# Patient Record
Sex: MALE | Race: WHITE | NOT HISPANIC OR LATINO | Employment: UNEMPLOYED | URBAN - METROPOLITAN AREA
[De-identification: names, ages, dates, MRNs, and addresses within clinical notes are randomized per-mention and may not be internally consistent; named-entity substitution may affect disease eponyms.]

---

## 2020-03-05 ENCOUNTER — HOSPITAL ENCOUNTER (OUTPATIENT)
Dept: OTHER | Facility: HOSPITAL | Age: 70
Discharge: HOME | End: 2020-03-05
Attending: FAMILY MEDICINE

## 2020-03-05 ENCOUNTER — OFFICE VISIT CONVERTED (OUTPATIENT)
Dept: FAMILY MEDICINE CLINIC | Age: 70
End: 2020-03-05
Attending: FAMILY MEDICINE

## 2020-03-05 LAB
ALBUMIN SERPL-MCNC: 4.2 G/DL (ref 3.5–5)
ALBUMIN/GLOB SERPL: 1.4 {RATIO} (ref 1.4–2.6)
ALP SERPL-CCNC: 70 U/L (ref 56–155)
ALT SERPL-CCNC: 14 U/L (ref 10–40)
ANION GAP SERPL CALC-SCNC: 15 MMOL/L (ref 8–19)
AST SERPL-CCNC: 12 U/L (ref 15–50)
BASOPHILS # BLD MANUAL: 0.16 10*3/UL (ref 0–0.2)
BASOPHILS NFR BLD MANUAL: 1.4 % (ref 0–3)
BILIRUB SERPL-MCNC: 0.21 MG/DL (ref 0.2–1.3)
BUN SERPL-MCNC: 18 MG/DL (ref 5–25)
BUN/CREAT SERPL: 13 {RATIO} (ref 6–20)
CALCIUM SERPL-MCNC: 9.1 MG/DL (ref 8.7–10.4)
CHLORIDE SERPL-SCNC: 101 MMOL/L (ref 99–111)
CHOLEST SERPL-MCNC: 153 MG/DL (ref 107–200)
CHOLEST/HDLC SERPL: 4 {RATIO} (ref 3–6)
CONV CO2: 26 MMOL/L (ref 22–32)
CONV TOTAL PROTEIN: 7.2 G/DL (ref 6.3–8.2)
CREAT UR-MCNC: 1.34 MG/DL (ref 0.7–1.2)
DEPRECATED RDW RBC AUTO: 41.7 FL
EOSINOPHIL # BLD MANUAL: 0.44 10*3/UL (ref 0–0.7)
EOSINOPHIL NFR BLD MANUAL: 4 % (ref 0–7)
ERYTHROCYTE [DISTWIDTH] IN BLOOD BY AUTOMATED COUNT: 12.1 % (ref 11.5–14.5)
GFR SERPLBLD BASED ON 1.73 SQ M-ARVRAT: 53 ML/MIN/{1.73_M2}
GLOBULIN UR ELPH-MCNC: 3 G/DL (ref 2–3.5)
GLUCOSE SERPL-MCNC: 95 MG/DL (ref 70–99)
GRANS (ABSOLUTE): 6.65 10*3/UL (ref 2–8)
GRANS: 59.8 % (ref 30–85)
HBA1C MFR BLD: 15 G/DL (ref 14–18)
HCT VFR BLD AUTO: 45.1 % (ref 42–52)
HDLC SERPL-MCNC: 38 MG/DL (ref 40–60)
IMM GRANULOCYTES # BLD: 0.03 10*3/UL (ref 0–0.54)
IMM GRANULOCYTES NFR BLD: 0.3 % (ref 0–0.43)
LDLC SERPL CALC-MCNC: 87 MG/DL (ref 70–100)
LYMPHOCYTES # BLD MANUAL: 3.16 10*3/UL (ref 1–5)
LYMPHOCYTES NFR BLD MANUAL: 6.1 % (ref 3–10)
MCH RBC QN AUTO: 30.5 PG (ref 27–31)
MCHC RBC AUTO-ENTMCNC: 33.3 G/DL (ref 33–37)
MCV RBC AUTO: 91.7 FL (ref 80–96)
MONOCYTES # BLD AUTO: 0.68 10*3/UL (ref 0.2–1.2)
OSMOLALITY SERPL CALC.SUM OF ELEC: 288 MOSM/KG (ref 273–304)
PLATELET # BLD AUTO: 304 10*3/UL (ref 130–400)
PMV BLD AUTO: 9.4 FL (ref 7.4–10.4)
POTASSIUM SERPL-SCNC: 4.4 MMOL/L (ref 3.5–5.3)
RBC # BLD AUTO: 4.92 10*6/UL (ref 4.7–6.1)
SODIUM SERPL-SCNC: 138 MMOL/L (ref 135–147)
TRIGL SERPL-MCNC: 139 MG/DL (ref 40–150)
TSH SERPL-ACNC: 2.18 M[IU]/L (ref 0.27–4.2)
VARIANT LYMPHS NFR BLD MANUAL: 28.4 % (ref 20–45)
VLDLC SERPL-MCNC: 28 MG/DL (ref 5–37)
WBC # BLD AUTO: 11.12 10*3/UL (ref 4.8–10.8)

## 2020-10-08 ENCOUNTER — HOSPITAL ENCOUNTER (OUTPATIENT)
Dept: OTHER | Facility: HOSPITAL | Age: 70
Discharge: HOME | End: 2020-10-08
Attending: FAMILY MEDICINE

## 2020-10-08 ENCOUNTER — OFFICE VISIT CONVERTED (OUTPATIENT)
Dept: FAMILY MEDICINE CLINIC | Age: 70
End: 2020-10-08
Attending: FAMILY MEDICINE

## 2020-10-08 LAB
ALBUMIN SERPL-MCNC: 3.4 G/DL (ref 3.5–5)
ALBUMIN/GLOB SERPL: 0.9 {RATIO} (ref 1.4–2.6)
ALP SERPL-CCNC: 614 U/L (ref 56–155)
ALT SERPL-CCNC: 103 U/L (ref 10–40)
ANION GAP SERPL CALC-SCNC: 22 MMOL/L (ref 8–19)
APPEARANCE UR: ABNORMAL
AST SERPL-CCNC: 57 U/L (ref 15–50)
BACTERIA UR QL AUTO: ABNORMAL
BASOPHILS # BLD AUTO: 0.41 10*3/UL (ref 0–0.2)
BASOPHILS NFR BLD AUTO: 3.6 % (ref 0–3)
BILIRUB SERPL-MCNC: 20.63 MG/DL (ref 0.2–1.3)
BUN SERPL-MCNC: 16 MG/DL (ref 5–25)
BUN/CREAT SERPL: 11 {RATIO} (ref 6–20)
CALCIUM SERPL-MCNC: 10.5 MG/DL (ref 8.7–10.4)
CASTS URNS QL MICRO: ABNORMAL /[LPF]
CHLORIDE SERPL-SCNC: 95 MMOL/L (ref 99–111)
COLOR UR: ABNORMAL
CONV ABS IMM GRAN: 0.13 10*3/UL (ref 0–0.2)
CONV BILI, CONJUGATED: >10 MG/DL (ref 0–0.6)
CONV CO2: 23 MMOL/L (ref 22–32)
CONV IMMATURE GRAN: 1.2 % (ref 0–1.8)
CONV TOTAL PROTEIN: 7.2 G/DL (ref 6.3–8.2)
CONV UNCONJUGATED BILIRUBIN: 10.6 MG/DL (ref 0–1.1)
CREAT UR-MCNC: 1.44 MG/DL (ref 0.7–1.2)
DEPRECATED RDW RBC AUTO: 53.1 FL (ref 35.1–43.9)
EOSINOPHIL # BLD AUTO: 0.37 10*3/UL (ref 0–0.7)
EOSINOPHIL # BLD AUTO: 3.3 % (ref 0–7)
EPI CELLS #/AREA URNS HPF: ABNORMAL /[HPF]
ERYTHROCYTE [DISTWIDTH] IN BLOOD BY AUTOMATED COUNT: 16.4 % (ref 11.6–14.4)
GFR SERPLBLD BASED ON 1.73 SQ M-ARVRAT: 49 ML/MIN/{1.73_M2}
GLOBULIN UR ELPH-MCNC: 3.8 G/DL (ref 2–3.5)
GLUCOSE SERPL-MCNC: 127 MG/DL (ref 70–99)
HCT VFR BLD AUTO: 40.8 % (ref 42–52)
HGB BLD-MCNC: 14.2 G/DL (ref 14–18)
LIPASE SERPL-CCNC: 48 U/L (ref 5–51)
LYMPHOCYTES # BLD AUTO: 1.8 10*3/UL (ref 1–5)
LYMPHOCYTES NFR BLD AUTO: 15.9 % (ref 20–45)
MCH RBC QN AUTO: 30.7 PG (ref 27–31)
MCHC RBC AUTO-ENTMCNC: 34.8 G/DL (ref 33–37)
MCV RBC AUTO: 88.3 FL (ref 80–96)
MONOCYTES # BLD AUTO: 1.02 10*3/UL (ref 0.2–1.2)
MONOCYTES NFR BLD AUTO: 9 % (ref 3–10)
MUCOUS THREADS URNS QL MICRO: ABNORMAL
NEUTROPHILS # BLD AUTO: 7.57 10*3/UL (ref 2–8)
NEUTROPHILS NFR BLD AUTO: 67 % (ref 30–85)
NRBC CBCN: 0 % (ref 0–0.7)
OSMOLALITY SERPL CALC.SUM OF ELEC: 283 MOSM/KG (ref 273–304)
PLATELET # BLD AUTO: 401 10*3/UL (ref 130–400)
PMV BLD AUTO: 10.9 FL (ref 9.4–12.4)
POTASSIUM SERPL-SCNC: 4.7 MMOL/L (ref 3.5–5.3)
RBC # BLD AUTO: 4.62 10*6/UL (ref 4.7–6.1)
RBC # BLD AUTO: ABNORMAL /[HPF]
SODIUM SERPL-SCNC: 135 MMOL/L (ref 135–147)
SP GR UR STRIP: >=1.03 (ref 1–1.03)
SPECIMEN SOURCE: ABNORMAL
TSH SERPL-ACNC: 1.42 M[IU]/L (ref 0.27–4.2)
UNIDENT CRYS URNS QL MICRO: ABNORMAL /[HPF]
WBC # BLD AUTO: 11.3 10*3/UL (ref 4.8–10.8)
WBC #/AREA URNS HPF: ABNORMAL /[HPF]

## 2020-10-19 ENCOUNTER — HOSPITAL ENCOUNTER (OUTPATIENT)
Dept: OTHER | Facility: HOSPITAL | Age: 70
Discharge: HOME | End: 2020-10-19
Attending: NURSE PRACTITIONER

## 2020-10-19 ENCOUNTER — CONVERSION ENCOUNTER (OUTPATIENT)
Dept: OTHER | Facility: HOSPITAL | Age: 70
End: 2020-10-19

## 2020-10-19 ENCOUNTER — OFFICE VISIT CONVERTED (OUTPATIENT)
Dept: GASTROENTEROLOGY | Facility: CLINIC | Age: 70
End: 2020-10-19
Attending: NURSE PRACTITIONER

## 2020-10-19 LAB
ALBUMIN SERPL-MCNC: 4 G/DL (ref 3.5–5)
ALBUMIN/GLOB SERPL: 1 {RATIO} (ref 1.4–2.6)
ALP SERPL-CCNC: 750 U/L (ref 56–155)
ALT SERPL-CCNC: 95 U/L (ref 10–40)
ANION GAP SERPL CALC-SCNC: 24 MMOL/L (ref 8–19)
AST SERPL-CCNC: 64 U/L (ref 15–50)
BASOPHILS # BLD AUTO: 0.29 10*3/UL (ref 0–0.2)
BASOPHILS NFR BLD AUTO: 2.2 % (ref 0–3)
BILIRUB SERPL-MCNC: 31.88 MG/DL (ref 0.2–1.3)
BUN SERPL-MCNC: 21 MG/DL (ref 5–25)
BUN/CREAT SERPL: 13 {RATIO} (ref 6–20)
CALCIUM SERPL-MCNC: 10.9 MG/DL (ref 8.7–10.4)
CHLORIDE SERPL-SCNC: 94 MMOL/L (ref 99–111)
CONV ABS IMM GRAN: 0.11 10*3/UL (ref 0–0.2)
CONV CO2: 21 MMOL/L (ref 22–32)
CONV IMMATURE GRAN: 0.8 % (ref 0–1.8)
CONV TOTAL PROTEIN: 8.1 G/DL (ref 6.3–8.2)
CREAT UR-MCNC: 1.6 MG/DL (ref 0.7–1.2)
DEPRECATED RDW RBC AUTO: 57.3 FL (ref 35.1–43.9)
EOSINOPHIL # BLD AUTO: 0.19 10*3/UL (ref 0–0.7)
EOSINOPHIL # BLD AUTO: 1.4 % (ref 0–7)
ERYTHROCYTE [DISTWIDTH] IN BLOOD BY AUTOMATED COUNT: 17.9 % (ref 11.6–14.4)
FERRITIN SERPL-MCNC: 1706 NG/ML (ref 30–300)
GFR SERPLBLD BASED ON 1.73 SQ M-ARVRAT: 43 ML/MIN/{1.73_M2}
GLOBULIN UR ELPH-MCNC: 4.1 G/DL (ref 2–3.5)
GLUCOSE SERPL-MCNC: 122 MG/DL (ref 70–99)
HCT VFR BLD AUTO: 39.4 % (ref 42–52)
HGB BLD-MCNC: 13.6 G/DL (ref 14–18)
INR PPP: 1.21 (ref 2–3)
IRON SATN MFR SERPL: 60 % (ref 20–55)
IRON SERPL-MCNC: 158 UG/DL (ref 70–180)
LYMPHOCYTES # BLD AUTO: 1.19 10*3/UL (ref 1–5)
LYMPHOCYTES NFR BLD AUTO: 9 % (ref 20–45)
MCH RBC QN AUTO: 30.5 PG (ref 27–31)
MCHC RBC AUTO-ENTMCNC: 34.5 G/DL (ref 33–37)
MCV RBC AUTO: 88.3 FL (ref 80–96)
MONOCYTES # BLD AUTO: 1.48 10*3/UL (ref 0.2–1.2)
MONOCYTES NFR BLD AUTO: 11.2 % (ref 3–10)
NEUTROPHILS # BLD AUTO: 9.94 10*3/UL (ref 2–8)
NEUTROPHILS NFR BLD AUTO: 75.4 % (ref 30–85)
NRBC CBCN: 0 % (ref 0–0.7)
OSMOLALITY SERPL CALC.SUM OF ELEC: 282 MOSM/KG (ref 273–304)
PLATELET # BLD AUTO: 465 10*3/UL (ref 130–400)
PMV BLD AUTO: 11.7 FL (ref 9.4–12.4)
POTASSIUM SERPL-SCNC: 4.7 MMOL/L (ref 3.5–5.3)
PROTHROMBIN TIME: 12.6 S (ref 9.4–12)
RBC # BLD AUTO: 4.46 10*6/UL (ref 4.7–6.1)
SODIUM SERPL-SCNC: 134 MMOL/L (ref 135–147)
TIBC SERPL-MCNC: 263 UG/DL (ref 245–450)
TRANSFERRIN SERPL-MCNC: 184 MG/DL (ref 215–365)
WBC # BLD AUTO: 13.2 10*3/UL (ref 4.8–10.8)

## 2020-10-20 LAB
CONV HEPATITIS B SURFACE AG W CONFIRMATION RE: NEGATIVE
CONV IMMUNOGLOBULIN G (IGG): 1535 MG/DL (ref 603–1613)
CONV IMMUNOGLOBULIN M (IGM): 183 MG/DL (ref 20–172)
DEPRECATED MITOCHONDRIA M2 IGG SER-ACNC: <20 UNITS (ref 0–20)
HAV IGM SERPL QL IA: NEGATIVE
HBV CORE IGM SERPL QL IA: NEGATIVE
HCV AB SER DONR QL: <0.1 S/CO RATIO (ref 0–0.9)
IGA SERPL-MCNC: 337 MG/DL (ref 61–437)
PROT PATTERN SERPL IFE-IMP: ABNORMAL
SMOOTH MUSCLE F-ACTIN AB IGG: 7 UNITS (ref 0–19)

## 2020-10-21 LAB
BACTERIA UR CULT: NORMAL
DSDNA AB SER-ACNC: NEGATIVE [IU]/ML
ENA AB SER IA-ACNC: NEGATIVE {RATIO}

## 2020-10-22 LAB — CONV NASH FIBROSURE: NORMAL

## 2020-10-27 ENCOUNTER — OFFICE VISIT CONVERTED (OUTPATIENT)
Dept: FAMILY MEDICINE CLINIC | Age: 70
End: 2020-10-27
Attending: FAMILY MEDICINE

## 2020-10-27 LAB
A1AT SERPL-MCNC: 249 MG/DL (ref 101–187)
PHENOTYPE: ABNORMAL

## 2020-11-28 ENCOUNTER — HOSPITAL ENCOUNTER (OUTPATIENT)
Dept: PREADMISSION TESTING | Facility: HOSPITAL | Age: 70
Discharge: HOME | End: 2020-11-28
Attending: INTERNAL MEDICINE

## 2020-11-29 LAB — SARS-COV-2 RNA SPEC QL NAA+PROBE: NOT DETECTED

## 2020-12-09 ENCOUNTER — HOSPITAL ENCOUNTER (OUTPATIENT)
Dept: PREADMISSION TESTING | Facility: HOSPITAL | Age: 70
Discharge: HOME | End: 2020-12-09
Attending: INTERNAL MEDICINE

## 2020-12-10 ENCOUNTER — OFFICE VISIT CONVERTED (OUTPATIENT)
Dept: FAMILY MEDICINE CLINIC | Age: 70
End: 2020-12-10
Attending: FAMILY MEDICINE

## 2020-12-11 LAB — SARS-COV-2 RNA SPEC QL NAA+PROBE: NOT DETECTED

## 2020-12-17 ENCOUNTER — HOSPITAL ENCOUNTER (OUTPATIENT)
Dept: GASTROENTEROLOGY | Facility: HOSPITAL | Age: 70
Setting detail: HOSPITAL OUTPATIENT SURGERY
Discharge: HOME | End: 2020-12-17
Attending: INTERNAL MEDICINE

## 2020-12-23 ENCOUNTER — OFFICE VISIT CONVERTED (OUTPATIENT)
Dept: SURGERY | Facility: CLINIC | Age: 70
End: 2020-12-23
Attending: SURGERY

## 2020-12-23 ENCOUNTER — CONVERSION ENCOUNTER (OUTPATIENT)
Dept: SURGERY | Facility: CLINIC | Age: 70
End: 2020-12-23

## 2020-12-30 ENCOUNTER — HOSPITAL ENCOUNTER (OUTPATIENT)
Dept: PREADMISSION TESTING | Facility: HOSPITAL | Age: 70
Discharge: HOME | End: 2020-12-30
Attending: SURGERY

## 2020-12-31 LAB — SARS-COV-2 RNA SPEC QL NAA+PROBE: NOT DETECTED

## 2021-01-04 ENCOUNTER — HOSPITAL ENCOUNTER (OUTPATIENT)
Dept: PERIOP | Facility: HOSPITAL | Age: 71
Setting detail: HOSPITAL OUTPATIENT SURGERY
Discharge: HOME | End: 2021-01-04
Attending: SURGERY

## 2021-01-14 ENCOUNTER — CONVERSION ENCOUNTER (OUTPATIENT)
Dept: SURGERY | Facility: CLINIC | Age: 71
End: 2021-01-14

## 2021-01-14 ENCOUNTER — OFFICE VISIT CONVERTED (OUTPATIENT)
Dept: SURGERY | Facility: CLINIC | Age: 71
End: 2021-01-14
Attending: SURGERY

## 2021-05-10 NOTE — H&P
"   History and Physical      Patient Name: Viral Bach   Patient ID: [id number]   Sex: Male   YOB: 1950    Primary Care Provider: Miah Meade MD   Referring Provider: Miah Meade MD    Visit Date: October 19, 2020    Provider: ANA Barber   Location: [facility] [place]   Location Address: [address] [place] [address]  [address] [place] [address]  [id number]          Chief Complaint  · Elevated LFTs      History Of Present Illness  The patient is a 70 year old /White male who presents on referral from Miah Meade MD for a gastroenterology evaluation.      Patient presents today with new diagnosis of h pylori and a \"yellowing\" of his skin all within the last 2 weeks. Currently taking lansoprazole, amoxicillin, and clarithromycin but has yet to notice any difference. Admits to abdominal bloating and decreased appetite. 10lb weight loss in the last 2 months, unintentionally. Denies heartburn, dysphagia, or vomiting. Occasional nausea.    Bowel movement 1-3x daily, soft to loose stool. Denies any hematochezia, melena, or family hx of colon cancer. Pt has never had a colonoscopy.    Admits to drinking on the weekends, 6-7 beers a day. He has been doing this for several years. 1 unprofessional tattoo from 40+ years ago. Denies hx of iv/intranasal drug use or hx of blood transfusion. Denies any confusion however having a hard time sleeping at night. He also notes his urine is \"darker\" than normal.     H. pylori urea breath test 10/8/2020: Positive   CBC 10/8/2020: WBC 11.30, hemoglobin 14.2, hematocrit 40.8, platelets 401.  CMP GFR 49, alkaline phosphatase 614, AST 57, ALT 33, lipase 48, total bilirubin 20.63, unconjugated bilirubin 10.6, conjugated bili greater than 10.       Past Medical History  HTN (hypertension)         Past Surgical History  *Denies any surgical procedures; *No Past Surgical History         Medication List  amoxicillin 500 mg oral capsule; clarithromycin " 500 mg oral tablet; lansoprazole 30 mg oral capsule,delayed release(DR/EC); lisinopril 10 mg oral tablet         Allergy List  NO KNOWN DRUG ALLERGIES       Allergies Reconciled  Family Medical History  Family history of heart disease         Social History  Tobacco (Current every day)         Review of Systems  · Constitutional  o Denies  o : chills, fever  · Eyes  o Denies  o : blurred vision, changes in vision  · Cardiovascular  o Denies  o : chest pain, syncope  · Respiratory  o Denies  o : shortness of breath, dry cough  · Gastrointestinal  o Admits  o : See HPI  · Genitourinary  o Denies  o : dysuria, blood in urine  · Integument  o Denies  o : rash, new skin lesions  · Neurologic  o Denies  o : altered mental status, tingling or numbness  · Musculoskeletal  o Denies  o : joint pain, limitation of motion  · Endocrine  o Admits  o : weight loss  o Denies  o : weight gain  · Psychiatric  o Denies  o : anxiety, depression      Physical Examination  · Constitutional  o Appearance  o : well developed, well-nourished, in no acute distress  · Eyes  o Vision  o :   § Visual Fields  § : eyes move symmetrical in all directions  o Sclerae  o : anicteric  o Pupils and Irises  o : pupils equal and symmetrical  · Neck  o Inspection/Palpation  o : supple  · Respiratory  o Respiratory Effort  o : breathing unlabored  o Inspection of Chest  o : normal appearance, no retractions  o Auscultation of Lungs  o : clear to auscultation bilaterally  · Cardiovascular  o Heart  o :   § Auscultation of Heart  § : no murmurs, gallops or rubs  · Gastrointestinal  o Abdominal Examination  o : soft, nontender to palpation, with normal active bowel sounds, no appreciable hepatosplenomegaly  o Digital Rectal Exam  o : deferred  · Lymphatic  o Neck  o : no palpable lymphadenopathy  · Skin and Subcutaneous Tissue  o General Inspection  o : without focal lesions; turgor is normal  · Psychiatric  o General  o : Alert and oriented x3  o Mood and  Affect  o : Mood and affect are appropriate to circumstances          Assessment  · Pre-op testing     V72.84/Z01.818  · Abdominal bloating     787.3/R14.0  · Elevated alkaline phosphatase level     790.5/R74.8  · Elevated bilirubin     277.4/R17  · Elevated liver enzymes     790.5/R74.8  · H. pylori infection     041.86/A04.8  · Weight loss     783.21/R63.4      Plan  · Orders  o [facility] Pre-Op Covid-19 Screening (20476) - - 10/19/2020  o CBC with Auto Diff [facility] (93355) - - 10/19/2020  o CMP [facility] (95992) - - 10/19/2020  o RUQ US (right upper quadrant ultrasound) (47127) - - 10/19/2020  o Consent for Colonoscopy with Possible Biopsy - Possible risks/complications, benefits, and alternatives to surgical or invasive procedure have been explained to patient and/or legal guardian. -Patient has been evaluated and can tolerate anesthesia and/or sedation. Risks, benefits, and alternatives to anesthesia and sedation have been explained to patient and/or legal guardian. (49426) - - 10/19/2020  o Consent for Esophagogastroduodenoscopy (EGD) - Possible risks/complications, benefits, and alternatives to surgical or invasive procedure have been explained to patient and/or legal guardian. - Patient has been evaluated and can tolerate anesthesia and/or sedation. Risks, benefits, and alternatives to anesthesia and sedation have been explained to patient and/or legal guardian. (51601) - - 10/19/2020  o Iron + transferrin (TIBC, calculated % saturation) (44501) - - 10/19/2020  o Ferritin ser/plas (68346) - - 10/19/2020  o CAMPBELL (antinuclear antibody profile) by enzyme immunoassay (49619) - - 10/19/2020  o Anti-mitochondrial antibody assay (03266) - - 10/19/2020  o Anti-Smooth Muscle Antibody (ASMA) [facility] (47761) - - 10/19/2020  o Serum immunofixation electrophoresis (57675) - - 10/19/2020  o Alpha-1-Antitrypsin/Phenotype HM (21321, 41392) - - 10/19/2020  o PT (78640) - - 10/19/2020  o Acute hepatitis panel (HAV IgM, HbcAb IgM, HbsAg, HCV)  (71366, 46114, 88153, 41606, 36060) - - 10/19/2020  o CHAVEZ Fibrosure H (drawn at [facility] only and must be fasting 8 hours; requires HT and WT) (58508, 23528, 24791, 92099, 97832, 87235, 43246, 64744, 50688, 11344) - - 10/19/2020  o Urine Culture (Clean Catch) [facility] (70788) - - 10/19/2020  · Medications  o Medications have been Reconciled  · Instructions  o PLAN: Proceed with procedure. Patient understands risks and benefits and is willing to proceed. Understands the risks include, but are not limited to, bleeding and/or perforation.  o Information given on current diagnoses.  o Lifestyle modifications discussed.  o RUQ u/s STAT, concerned for possible biliary obstruction given Bilirubin.   o Electronically Identified Patient Education Materials Provided Electronically  · Disposition  o Follow up after procedure            Electronically Signed by: ANA Barber -Author on October 19, 2020 09:40:16 AM

## 2021-05-10 NOTE — H&P
History and Physical      Patient Name: Viral Bach   Patient ID: [id number]   Sex: Male   YOB: 1950    Primary Care Provider: Miah Meade MD   Referring Provider: Miah Meade MD    Visit Date: December 23, 2020    Provider: Oswald Rios MD   Location: [facility]   Location Address: [address] [place] [address]  [id number]   Location Phone: [phone]          Chief Complaint  · Outpatient History & Physical / Surgical Orders  · Gallbladder Consult      History Of Present Illness  Viral Bach is a 70 year old /White male who presents to the office today as a consult from Miah Meade MD. The patient follows up today for consideration for laparoscopic cholecystectomy. I last met Mr. Bach in the hospital where he was admitted for choledocholithiasis. He had such a large stone in his common duct that it was unable to be retrieved by ERCP. He had a stent placed and was given some time to decompress. He then went to the operating room where the stone was removed with ERCP and lithotripsy successfully. Now that he has his common duct stone removed he presents today for evaluation for cholecystectomy. The patient reports that since the procedure he is doing quite well, not reporting any symptoms, no signs of infection, no signs of cholecystitis.       Past Medical History  HTN (hypertension)         Past Surgical History  ERCP         Medication List  amoxicillin 500 mg oral capsule; clarithromycin 500 mg oral tablet; lansoprazole 30 mg oral capsule,delayed release(DR/EC); lisinopril 10 mg oral tablet; ursodiol 300 mg oral capsule         Allergy List  NO KNOWN DRUG ALLERGIES       Allergies Reconciled  Family Medical History  Family history of heart disease         Social History  Tobacco (Current every day)         Review of Systems  · Gastrointestinal  o Denies  o : nausea, vomiting, diarrhea, constipation      Vitals  Date Time BP Position Site L\R  "Cuff Size HR RR TEMP (F) WT  HT  BMI kg/m2 BSA m2 O2 Sat FR L/min FiO2 HC       12/23/2020 03:06 PM         144lbs 4oz 5'  8\" 21.93 1.77             Physical Examination  · Constitutional  o Appearance  o : well developed, well-nourished, alert and in no acute distress  · Head and Face  o Head  o :   § Inspection  § : no deformities or lesions  · Eyes  o Conjunctivae  o : clear  o Sclerae  o : clear  · Neck  o Inspection/Palpation  o : normal appearance, no masses or tenderness, trachea midline  · Respiratory  o Respiratory Effort  o : breathing unlabored  o Inspection of Chest  o : normal appearance, no retractions  · Cardiovascular  o Heart  o : regular rate and rhythm  · Gastrointestinal  o Abdominal Examination  o : soft  · Lymphatic  o Neck  o : no lymphadenopathy present  o Axilla  o : no lymphadenopathy present  o Groin  o : no lymphadenopathy present  · Skin and Subcutaneous Tissue  o General Inspection  o : no rashes present, no lesions present, no areas of discoloration  · Neurologic  o Cranial Nerves  o : grossly intact  o Sensation  o : grossly intact  o Gait and Station  o :   § Gait Screening  § : normal gait, able to stand without diffculty  o Cerebellar Function  o : no obvious abnormalities  · Psychiatric  o Judgement and Insight  o : judgment and insight intact  o Mood and Affect  o : mood normal, affect appropriate              Assessment  · Pre-Surgical Orders     V72.84  · Cholelithiasis     574.20/K80.20  · Pre-op testing     V72.84/Z01.818       Patient with history of choledocholithiasis in need of cholecystectomy to prevent any further issues with choledocholithiasis.       Plan  · Orders  o General Surgery Order (GENOR) - 574.20/K80.20 - 01/04/2021  o [facility] Pre-Op Covid-19 Screening (03034) - V72.84/Z01.818 - 12/30/2020   [facility] drive thru on 12/30/20 at 115PM  · Medications  o Medications have been Reconciled  o Transition of Care or Provider Policy  · Instructions  o PLAN:   o Handouts " Provided-Pre-Procedure Instructions including date and time and location of procedure.  o Surgical Facility: [facility]  o ****Patient Status****  o Outpatient  o ********************  o RISK AND BENEFITS:  o Consent for surgery: Given these options, the patient has verbally expressed an understanding of the risks of surgery and finds these risks acceptable. We will proceed with surgery as soon as possible.  o Consult Anesthesia for any post-operative block, or any pain management procedure deemed necessary by the anestesiologist for adequate post-operative pain control.   o O.R. PREP: Per protocol  o IV: Per Anesthesia  o PLEASE SIGN PERMIT FOR:Laparoscopic cholecystectomy, possible open procedure  o *__Kefzol 2 gram IV on call to OR.  o Indocyanine Green- 2.5MG IV- On Call To OR  o *___The above History and Physical Examination has been completed within 30 days of admission.  o Pre-Admission Testing Date: PAT PHONE: 12/29/20 at 1230PM  o Electronically Identified Patient Education Materials Provided Electronically     We will plan for laparoscopic cholecystectomy in the near future.  Risks, benefits, and alternatives of the procedure were discussed extensively.  All questions were answered.  The patient voiced understanding and agreed to proceed with the above plan.             Electronically Signed by: Alba Hallman-, Other -Author on December 28, 2020 01:58:16 PM  Electronically Co-signed by: Oswald Rios MD -Reviewer on December 28, 2020 08:26:56 PM

## 2021-05-14 VITALS — BODY MASS INDEX: 21.37 KG/M2 | HEIGHT: 68 IN | RESPIRATION RATE: 14 BRPM | WEIGHT: 141 LBS

## 2021-05-14 VITALS — HEIGHT: 68 IN | BODY MASS INDEX: 21.86 KG/M2 | WEIGHT: 144.25 LBS

## 2021-05-14 VITALS
BODY MASS INDEX: 20.8 KG/M2 | SYSTOLIC BLOOD PRESSURE: 140 MMHG | WEIGHT: 137.25 LBS | TEMPERATURE: 97 F | DIASTOLIC BLOOD PRESSURE: 59 MMHG | HEART RATE: 66 BPM | HEIGHT: 68 IN | RESPIRATION RATE: 16 BRPM

## 2021-05-14 NOTE — PROGRESS NOTES
"   Progress Note      Patient Name: Viral Bach   Patient ID: [id number]   Sex: Male   YOB: 1950    Primary Care Provider: Miah Meade MD   Referring Provider: Miah Meade MD    Visit Date: January 14, 2021    Provider: Oswald Rios MD   Location: [facility]   Location Address: [address] [place] [address]  [id number]   Location Phone: [phone]          Chief Complaint  · Follow Up Surgery      History Of Present Illness  Viral Bach is a 70 year old /White male who presents today for a postoperative visit. He follows-up status post laparoscopic cholecystectomy. He had a fairly difficult cholecystectomy. I did leave a drain. Since discharge, he has done well and has not reported any unexpected signs or symptoms. He is eating and drinking normally and is having regular bowel movements. He still has a little bit of pain but overall is feeling well. He has about 10 cc a day coming out of his drain. It appears to be serosanguineous.       Past Medical History  HTN (hypertension)         Past Surgical History  Cholecystectomy; ERCP; Kidney Stone Surgery, Unspecified         Medication List  amoxicillin 500 mg oral capsule; clarithromycin 500 mg oral tablet; lansoprazole 30 mg oral capsule,delayed release(DR/EC); lisinopril 10 mg oral tablet         Allergy List  NO KNOWN DRUG ALLERGIES         Family Medical History  Family history of heart disease         Social History  Tobacco (Current every day)         Review of Systems  · Cardiovascular  o Denies  o : chest pain on exertion, shortness of breath, lower extremity swelling  · Respiratory  o Denies  o : shortness of breath, coughing up blood  · Gastrointestinal  o Denies  o : chronic abdominal pain      Vitals  Date Time BP Position Site L\R Cuff Size HR RR TEMP (F) WT  HT  BMI kg/m2 BSA m2 O2 Sat FR L/min FiO2 HC       01/14/2021 09:01 AM       14  141lbs 0oz 5'  8\" 21.44 1.75             Physical " Examination  · Constitutional  o Appearance  o : well developed, well-nourished, alert and in no acute distress  · Head and Face  o Head  o :   § Inspection  § : no deformities or lesions  · Eyes  o Conjunctivae  o : clear  o Sclerae  o : nonicteric  · Neck  o Inspection/Palpation  o : normal appearance, no masses or tenderness, trachea midline  · Respiratory  o Respiratory Effort  o : breathing unlabored  o Inspection of Chest  o : normal appearance, no retractions  · Cardiovascular  o Heart  o : regular rate and rhythm  · Gastrointestinal  o Abdominal Examination  o :   § Abdomen  § : abdomen is soft. Incisions appear to be healing well. His drain is intact. It has about 8 cc of fluid in it. It is serosanguineous appearing.  · Lymphatic  o Neck  o : no lymphadenopathy present  o Axilla  o : no lymphadenopathy present  o Groin  o : no lymphadenopathy present  · Skin and Subcutaneous Tissue  o General Inspection  o : no rashes present, no lesions present, no areas of discoloration  · Neurologic  o Cranial Nerves  o : grossly intact  o Sensation  o : grossly intact  o Gait and Station  o :   § Gait Screening  § : normal gait, able to stand without diffculty  o Cerebellar Function  o : no obvious abnormalities  · Psychiatric  o Judgement and Insight  o : judgment and insight intact  o Mood and Affect  o : mood normal, affect appropriate          Assessment  · Encounter for examination following surgery     V67.00/Z09       Patient status post laparoscopic cholecystectomy with the findings of acute cholecystitis and gallstones.       Plan  · Medications  o Medications have been Reconciled  o Transition of Care or Provider Policy  · Instructions  o Electronically Identified Patient Education Materials Provided Electronically     The patient is doing well and healing as expected. I am pleased with his overall progress. His drainage output appears to be non-bilious and is decreasing. I removed his drain today and he can  follow-up with me as needed. Call with any questions or concerns. I discussed all of this with the patient. All questions were answered.  They voiced understanding and agreed to proceed with the above plan.             Electronically Signed by: Melissa Colorado-, -Author on January 15, 2021 10:11:15 AM  Electronically Co-signed by: Oswald Rios MD -Reviewer on January 15, 2021 05:55:54 PM

## 2021-05-18 NOTE — PROGRESS NOTES
Viral Bach  1950     Office/Outpatient Visit    Visit Date: Thu, Dec 10, 2020 09:27 am    Provider: Miah Meade MD (Assistant: Berenice Carlton MA)    Location: [facility]        Electronically signed by Miah Meade MD on  12/10/2020 10:17:46 AM                             Subjective:        CC: Mr. Bach is a 70 year old White male.  This is a follow-up visit.          HPI:           Patient presents with essential (primary) hypertension.  He is not currently taking an antihypertensive.  He has not kept a blood pressure diary, but states that pressures have been too high.  He denies HA, blurry vision, CP, SOB, edema or palpitations       Viral presents to clinic today as a follow-up for common bile duct obstruction.  He was admitted to [facility] from 10/19-10/22 for an obstructing bile duct stone and obstructive jaundice.  He underwent an ERCP with stent placement.  Stone removal was unsuccessful at that time.  GI plans for a repeat ERCP with stent and stone removal on December 14.  Today, he reports that he feels great.  He denies abdominal pain, nausea, vomiting, diarrhea or constipation.  His jaundice is markedly improved.  No fever or chills.    ROS:     CONSTITUTIONAL:  Negative for chills, fatigue and fever.      EYES:  Positive for icterus (improved).   Negative for blurred vision.      CARDIOVASCULAR:  Negative for chest pain, dizziness, palpitations and edema.      RESPIRATORY:  Negative for dyspnea and cough.      GASTROINTESTINAL:  Negative for abdominal pain, constipation, diarrhea, heartburn, nausea and vomiting.      INTEGUMENTARY:  Positive for jaundice (improved).   Negative for pruritis.      NEUROLOGICAL:  Negative for headaches, paresthesias and weakness.      PSYCHIATRIC:  Negative for anxiety, depression, and sleep disturbances.          Past Medical History / Family History / Social History:         Last Reviewed on 12/10/2020 10:17 AM by  Miah Meade    Past Medical History:             PAST MEDICAL HISTORY     UNREMARKABLE         Surgical History:     NONE         Family History:         Positive for Coronary Artery Disease, Hypertension, Myocardial Infarction and Periph Vascular Disease;     Positive for Type 2 Diabetes;         Social History:     Occupation: ;     Marital Status: Single     Children: 3 children         Tobacco/Alcohol/Supplements:     Last Reviewed on 12/10/2020 10:17 AM by Miah Meade    Tobacco: Current Smoker: He currently smokes every day, 1 pack per day.          Substance Abuse History:     Last Reviewed on 12/10/2020 10:17 AM by Miah Meade    None         Mental Health History:     Last Reviewed on 12/10/2020 10:17 AM by Miah Meade    NEGATIVE         Communicable Diseases (eg STDs):     Last Reviewed on 12/10/2020 10:17 AM by Miah Meade    Reportable health conditions; NEGATIVE         Current Problems:     Last Reviewed on 12/10/2020 10:17 AM by Miah Meade    No Current Problems    Pain in left hip    Lumbago with sciatica, left side    Essential (primary) hypertension    Encounter for follow-up examination after completed treatment for conditions other than malignant neoplasm    Calculus of gallbladder and bile duct without cholecystitis with obstruction    Obstruction of bile duct    Helicobacter pylori [H. pylori] as the cause of diseases classified elsewhere        Immunizations:     None        Allergies:     Last Reviewed on 12/10/2020 10:17 AM by Miah Meade    No Known Allergies.        Current Medications:     Last Reviewed on 12/10/2020 10:17 AM by Miah Meade    No Known Medications.    URSODIOL 300MG CAPSULES  [TK ONE C PO  BID]        Objective:        Vitals:         Current: 12/10/2020 9:33:07 AM    Ht:  5 ft, 8 in;  Wt: 142.2 lbs;  BMI: 21.6T: 96.8 F (oral);  BP: 155/63 mm Hg (left arm, sitting);  P: 60 bpm (left arm (BP Cuff), sitting);  sCr: 1.44 mg/dL;  GFR: 41.16         Exams:     PHYSICAL EXAM:     GENERAL: Vitals recorded well developed, well nourished;  no apparent distress;     EYES: Icterus present but markedly imporoved from prior exam;     RESPIRATORY: CTA B, no wheezing/rales/rhonchi     CARDIOVASCULAR: regular rate and rhythm; normal S1, S2; no murmur, rub, or gallop; normal PMI;     GASTROINTESTINAL: nontender; normal bowel sounds; no masses;     SKIN: Diffuse jaundice present but markedly improved from prior exam;     NEUROLOGIC: mental status: alert and oriented x 3; Grossly intact;     PSYCHIATRIC: appropriate affect and demeanor; normal speech pattern; Normal behavior;         Assessment:         I10   Essential (primary) hypertension       K80.71   Calculus of gallbladder and bile duct without cholecystitis with obstruction       K83.1   Obstruction of bile duct           ORDERS:         Meds Prescribed:       [New Rx] lisinopriL 5 mg oral tablet [take 1 tablet (5 mg) by oral route once daily], #90 (ninety) tablets, Refills: 0 (zero)                 Plan:         Essential (primary) hypertension- Not controlled.  Will restart lisinopril at 5 mg daily.  Return to clinic in 1 month.          Prescriptions:       [New Rx] lisinopriL 5 mg oral tablet [take 1 tablet (5 mg) by oral route once daily], #90 (ninety) tablets, Refills: 0 (zero)         Calculus of gallbladder and bile duct without cholecystitis with obstruction- Stable.  S/p ERCP with stent placement for failed stone removal.  Continue ursodiol 300 mg twice daily.  GI plans for repeat ERCP with stent and stone removal on 12/14/2020.  Return to clinic in 1 month.  After stone is removed, he will need to be referred to general surgery for cholecystectomy.        Obstruction of bile duct- see above            Charge Capture:         Primary Diagnosis:     I10  Essential (primary) hypertension           Orders:      47058  Office/outpatient visit; established patient, level 4  (In-House)              K80.71   Calculus of gallbladder and bile duct without cholecystitis with obstruction     K83.1  Obstruction of bile duct

## 2021-05-18 NOTE — PROGRESS NOTES
Viral Bach  1950     Office/Outpatient Visit    Visit Date: Tue, Oct 27, 2020 12:53 pm    Provider: Miah Meade MD (Assistant: Carmella Ortiz MA)    Location: [facility]        Electronically signed by Miah Meade MD on  10/27/2020 02:19:33 PM                             Subjective:        CC: Mr. Bach is a 70 year old White male.  He is here today following a transition of care from an inpatient hospital: [facility]. The patient was admitted on 10-19-20 and discharged on 10-22-20. The patient was admitted for gallstone. Our office called the patient within 48 hours of discharge and scheduled the follow-up appointment. During the patient's hospital stay the patient was treated by hospital provider.  pt told to stop taking clarithrymycin and amoxicillin         HPI:       Viral presents to clinic today for hospital discharge follow-up.  He was admitted to [facility] from 10/19-10/22 for choledocholithiasis and obstructive jaundice. Viral was seen via telehealth by our clinic on 10/8. He complains of intermittent abdominal pain radiating through his back, pruritus and jaundice/icterus.  Basic lab work-up revealed creatinine of 1.44, alk phos 614, , AST 57 and total bili of 20.6. Stat orders for an MRCP and GI referral were placed.  Prior to MRCP being performed, Viral had appointment with GI.  GI ordered a CT that showed a 1.4 cm stone in the bile duct.  GI subsequently sent him to the emergency department for further evaluation.  While in the emergency department, he had an MRCP which confirmed a large stone in the common bile duct with associated marketed dilatation of the intrahepatic and extrahepatic biliary system.  ERCP was performed with stent placement.  However, stone removal was unsuccessful. His condition continued to improve he was discharged home in stable condition.  Lisinopril was discontinued due to poor renal function and lack of  high blood pressures while hospitalized.  Clarithromycin and amoxicillin (for H. pylori) were discontinued and antibiotics to cover both H. pylori and potential intra-abdominal infection (pus found during stent placement) were started including Levaquin and Flagyl. He was also started on ursodiol 300 mg twice daily.  He is to follow-up with gastroenterology in 4 weeks for repeat ERCP.  After successful stone removal, he has been advised that he will need a cholecystectomy with a general surgeon.  Today, he reports that his abdominal pain has improved.  He does have some residual right upper quadrant pain.  His primary complaint is that he is itching all over.  No fever or chills.  No nausea or vomiting.          With regard to the essential (primary) hypertension, he is not currently taking an antihypertensive.  He has not kept a blood pressure diary, but states that pressures have been too high.  He denies HA, blurry vision, CP, SOB, edema or palpitations       As noted above, last visit Viral was diagnosed with H pylori.   He  continues to take lansoprazole 30 mg twice daily.  However, as noted above, his clarithromycin and amoxicillin were discontinued in favor of Levaquin and Flagyl to cover both H. pylori and an intra-abdominal infection.  He continues to take these as directed and has 2 days left to complete his course.    ROS:     CONSTITUTIONAL:  Negative for chills, fatigue, fever, and weight change.      EYES:  Positive for icterus.   Negative for blurred vision.      CARDIOVASCULAR:  Negative for chest pain, dizziness, palpitations and edema.      RESPIRATORY:  Negative for dyspnea and cough.      GASTROINTESTINAL:  Positive for abdominal pain ( RUQ ).   Negative for constipation, diarrhea, heartburn, nausea or vomiting.      GENITOURINARY:  Positive for dark colored urine.   Negative for dysuria or hematuria.      INTEGUMENTARY:  Positive for jaundice and pruritis.      NEUROLOGICAL:  Negative for  headaches, paresthesias and weakness.      ENDOCRINE:  Negative for hair loss, heat/cold intolerance, polydipsia, and polyphagia.      PSYCHIATRIC:  Negative for anxiety, depression, and sleep disturbances.          Past Medical History / Family History / Social History:         Last Reviewed on 10/27/2020 01:56 PM by Miah Meade    Past Medical History:             PAST MEDICAL HISTORY     UNREMARKABLE         Surgical History:     NONE         Family History:         Positive for Coronary Artery Disease, Hypertension, Myocardial Infarction and Periph Vascular Disease;     Positive for Type 2 Diabetes;         Social History:     Occupation: ;     Marital Status: Single     Children: 3 children         Tobacco/Alcohol/Supplements:     Last Reviewed on 10/27/2020 01:56 PM by Miah Meade    Tobacco: Current Smoker: He currently smokes every day, 1 pack per day.          Substance Abuse History:     Last Reviewed on 10/27/2020 01:56 PM by Miah Meade    None         Mental Health History:     Last Reviewed on 10/27/2020 01:56 PM by Miah Meade    NEGATIVE         Communicable Diseases (eg STDs):     Last Reviewed on 10/27/2020 01:56 PM by Miah Meade    Reportable health conditions; NEGATIVE         Current Problems:     Last Reviewed on 10/27/2020 01:56 PM by Miah Meade    No Current Problems    Pain in left hip    Lumbago with sciatica, left side    Essential (primary) hypertension    Encounter for follow-up examination after completed treatment for conditions other than malignant neoplasm    Calculus of gallbladder and bile duct without cholecystitis with obstruction    Obstruction of bile duct    Helicobacter pylori [H. pylori] as the cause of diseases classified elsewhere        Immunizations:     None        Allergies:     Last Reviewed on 10/27/2020 01:56 PM by Miah Meade    No Known Allergies.        Current Medications:     Last Reviewed on 10/27/2020 01:56 PM by Miah Meade    No  Known Medications.    lxsiizbu-yyqoecpcedx-hshoeuqbc 500-500-30 mg oral Combination Package [take as directed]    lansoprazole 30 mg oral capsule,delayed release (enteric coated) [take 1 capsule (30 mg) by oral route 2 times per day ]    levofloxacin 750 mg tablet    metronidazole 500 mg tablet    ursodiol 300 mg capsule        Objective:        Vitals:         Current: 10/27/2020 1:03:04 PM    Ht:  5 ft, 8 in;  Wt: 135.4 lbs;  BMI: 20.6T: 97.9 F (oral);  BP: 149/66 mm Hg (left arm, sitting);  P: 78 bpm (left arm (BP Cuff), sitting);  sCr: 1.44 mg/dL;  GFR: 40.32        Repeat:     1:29:30 PM  BP:   137/76mm Hg (left arm, sitting)     Exams:     PHYSICAL EXAM:     GENERAL: Vitals recorded well developed, well nourished;  no apparent distress;     EYES: Icterus present;     RESPIRATORY: CTA B, no wheezing/rales/rhonchi     CARDIOVASCULAR: regular rate and rhythm; normal S1, S2; no murmur, rub, or gallop; normal PMI;     GASTROINTESTINAL: mild RUQ tenderness;  no guarding;  no rebound tenderness;  normal bowel sounds; no masses;     SKIN: Diffuse jaundice present;     NEUROLOGIC: Grossly intact; mental status: alert and oriented x 3;     PSYCHIATRIC: appropriate affect and demeanor; normal speech pattern; Normal behavior;         Assessment:         K80.71   Calculus of gallbladder and bile duct without cholecystitis with obstruction       K83.1   Obstruction of bile duct       I10   Essential (primary) hypertension       B96.81   Helicobacter pylori [H. pylori] as the cause of diseases classified elsewhere           ORDERS:         Lab Orders:       [id number]  [facility] Basic Metabolic Panel  (Send-Out)            80277  [facility] CBC with 3 part diff  (Send-Out)            02263  [facility] Hepatic Function Panel  (Send-Out)                      Plan:         Calculus of gallbladder and bile duct without cholecystitis with obstruction- Stable. S/P ERCP with stent placement but failed stone removal. Repeat labs ordered  today for surveillance.  Continue Levaquin and Flagyl.  Continue ursodiol 300 mg twice daily.   Follow-up with GI 4 weeks from discharge for repeat ERCP.  After bile stone is removed, patient will need to be referred to general surgery for cholecystectomy. RTC in 1 month or sooner prn. ED precautions given.    LABORATORY:  Labs ordered to be performed today include basic metabolic panel, CBC, and Hepatic function panel.  Transition of Care: Patient discharge summary has been reviewed and place in the electronic medical record. Patient was educated on their diagnosis, treatment, and any medication changes while being evaluated           Orders:       [id number]  [facility] Basic Metabolic Panel  (Send-Out)            48038  BDCB - [facility] CBC with 3 part diff  (Send-Out)            [id number]  [facility] Hepatic Function Panel  (Send-Out)              Obstruction of bile duct- see above        Essential (primary) hypertension- Lisinopril discontinued during recent hospitalization. BP has been controlled/borderline without. Continue w/o meds at this time but continue to monitor closely. If he does end up requiring therapy again, will consider amlodipine over lisinopril due to recent decreased renal function.        Helicobacter pylori [H. pylori] as the cause of diseases classified elsewhere- Continue lansoprazole 30 mg BID, flagyl 500 mg TID, and levaquin 750 mg QD. F/u with GI as above            Charge Capture:         Primary Diagnosis:     K80.71  Calculus of gallbladder and bile duct without cholecystitis with obstruction           Orders:      81379  Transitional care manage service 7 day discharge  (In-House)              K83.1  Obstruction of bile duct     I10  Essential (primary) hypertension     B96.81  Helicobacter pylori [H. pylori] as the cause of diseases classified elsewhere         ADDENDUMS:      ____________________________________    Addendum: 10/28/2020 06:06 PM - Miah Meade        Orders:    Remove  [id number]      Add  [id number]    -a

## 2021-05-18 NOTE — PROGRESS NOTES
Patria Bach  1950     Office/Outpatient Visit    Visit Date: Thu, Mar 5, 2020 02:08 pm    Provider: Miah Meade MD (Assistant: Spurling, Sarah C, MA)    Location: [facility]        Electronically signed by Maih Meade MD on  03/19/2020 12:50:55 PM                             Subjective:        CC: Mr. Bach is a 69 year old White male.  Pt was seen at the hospital in [facility] and he was told he had arthritis in his left hip and his toes go numb and it bothers his knee.          HPI: Patient presents clinic today to establish care. Overall, he says he is in good health.  However, he does have an acute complaint today.  He was recently seen at [facility] emergency department with complaints of acute left hip pain.  Left hip radiographs showed arthritis but was otherwise unremarkable.  He said he was discharged home with a diagnosis of arthritis and given Toradol and Flexeril. Today, he says that his pain has persisted. Toradol Flexeril helps some but not as much as he would like.  He reports a dull ache that is intermittently sharp in the left side of his low back that radiates to his left hip and towards his knee.  Intermittently, he says his toes on his left foot will go numb. He denies weakness.  No prior history of injury or trauma to the area he denies fever, chills, bowel/bladder incontinence or saddle anesthesia.    Of note, while patient was at the emergency department recently they found his blood pressure to be elevated.  He says he believes this is due to his pain and would like to avoid starting medication. He was given a 15 tablet supply of lisinopril 10 mg to be taken daily at the emergency department but he has not filled this.  He denies headache, blurry vision, chest pain, shortness of breath, edema or palpitations.          PHQ-9 Depression Screening: Completed form scanned and in chart; Total Score 10     ROS:     CONSTITUTIONAL:  Negative for chills, fatigue, fever, and  weight change.      EYES:  Negative for blurred vision.      E/N/T:  Negative for ear pain and tinnitus.      CARDIOVASCULAR:  Negative for chest pain, dizziness, palpitations and edema.      RESPIRATORY:  Negative for dyspnea and cough.      GASTROINTESTINAL:  Negative for abdominal pain, constipation, diarrhea, heartburn, nausea and vomiting.      GENITOURINARY:  Negative for dysuria, hematuria and polyuria.      MUSCULOSKELETAL:  Positive for back pain and left hip pain.      INTEGUMENTARY:  Negative for rash.      NEUROLOGICAL:  Positive for paresthesias.   Negative for headaches or weakness.      HEMATOLOGIC/LYMPHATIC:  Negative for easy bruising and excessive bleeding.      ENDOCRINE:  Negative for hair loss, heat/cold intolerance, polydipsia, and polyphagia.      PSYCHIATRIC:  Negative for anxiety, depression, and sleep disturbances.          Past Medical History / Family History / Social History:         Last Reviewed on 3/19/2020 12:50 PM by Miah Meade    Past Medical History:             PAST MEDICAL HISTORY     UNREMARKABLE         Surgical History:     NONE         Family History:         Positive for Coronary Artery Disease, Hypertension, Myocardial Infarction and Periph Vascular Disease;     Positive for Type 2 Diabetes;         Social History:     Occupation: ;     Marital Status: Single     Children: 3 children         Tobacco/Alcohol/Supplements:     Last Reviewed on 3/19/2020 12:50 PM by Miah Meade    Tobacco: Current Smoker: He currently smokes every day, 1 pack per day.          Substance Abuse History:     Last Reviewed on 3/19/2020 12:50 PM by Miah Meade    None         Mental Health History:     Last Reviewed on 3/19/2020 12:50 PM by Miah Meade    NEGATIVE         Communicable Diseases (eg STDs):     Last Reviewed on 3/19/2020 12:50 PM by Miah Meade    Reportable health conditions; NEGATIVE         Current Problems:     Last Reviewed on 3/19/2020 12:50 PM by Deshaun  "Miah    No Current Problems    Pain in left hip    Lumbago with sciatica, left side    Elevated blood-pressure reading, without diagnosis of hypertension    Encounter for screening for depression        Immunizations:     None        Allergies:     Last Reviewed on 3/19/2020 12:50 PM by Miah Meade    No Known Allergies.        Current Medications:     Last Reviewed on 3/19/2020 12:50 PM by Miah Meade    No Known Medications.    cyclobenzaprine 10 mg oral tablet [take 1 tablet (10 mg) by oral route 3 times per day as needed]        Objective:        Vitals:         Current: 3/5/2020 2:14:23 PM    Ht:  5 ft, 8 in;  Wt: 147.4 lbs;  BMI: 22.4T: 97.4 F (oral);  BP: 140/68 mm Hg (left arm, sitting);  P: 69 bpm (left arm (BP Cuff), sitting)        Exams:     PHYSICAL EXAM:     GENERAL: Vitals recorded well developed, well nourished;  no apparent distress;     EYES: conjunctiva and cornea are normal;     E/N/T:  normal EACs, TMs, nasal/oral mucosa, teeth, gingiva, and oropharynx;     NECK: trachea is midline; thyroid is non-palpable;     RESPIRATORY: Clear to auscultation bilateally; no rales (\"crackles\") present; no rhonchi; no wheezes;     CARDIOVASCULAR: normal rate; rhythm is regular;  No murmurs, clicks, gallops or rubs appreciated; no edema;     GASTROINTESTINAL: nontender; Soft and nondistended; normal bowel sounds; no organomegaly; no masses;     LYMPHATIC: no enlargement of cervical or facial nodes; no supraclavicular nodes;     SKIN:  No significant rashes, lesions or suspicious moles within limits of examination;     MUSCULOSKELETAL: Tenderness to palpation of left lumbar paraspinal muscles and left gluteal muscles; Negative SL raise bilaterally; Full ROM of left hip;     NEUROLOGIC: Grossly intact; mental status: alert and oriented x 3; reflexes: knee jerks: 2+;     PSYCHIATRIC: appropriate affect and demeanor; normal speech pattern; Normal behavior;         Procedures:     Pain in left hip    1. " Toradol 60 mg given IM in the left hip; administered by and;  lot number [id number]; expires 09/2021             Assessment:         M25.552   Pain in left hip       M54.42   Lumbago with sciatica, left side       R03.0   Elevated blood-pressure reading, without diagnosis of hypertension       Z13.31   Encounter for screening for depression           ORDERS:         Meds Prescribed:       [New Rx] diclofenac sodium 50 mg oral tablet, delayed release (enteric coated) [take 1 tablet (50 mg) by oral route 3 times per day as needed], #45 (forty five) tablets, Refills: 0 (zero)         Radiology/Test Orders:       [id number]  Radiologic examination, spine, lumbosacral;  minimum of four views  (Send-Out)              Lab Orders:       [id number]  BDCBC - [facility] CBC with 3 part diff  (Send-Out)            56641  COMP - [facility] Comp. Metabolic Panel  (Send-Out)            13603  LPDP - [facility] Lipid Panel  (Send-Out)            95534  TSH - [facility] TSH  (Send-Out)              Procedures Ordered:       [facility]  ([facility])              Other Orders:       75474  Therapeutic injection  (In-House)              Depression screen negative  (In-House)              Toradol, per 15 mg  (x4)                  Plan:         Pain in left hip- Previous left hip x-rays show arthritis which is a perfectly plausible explanation for patient's hip pain.  However, low back pain and radiation of symptoms down the left leg is concerning for lumbar degenerative changes with radiculopathy.  Will order a lumbosacral x-ray today for further eval. Referral placed to physical therapy.  60 mg IM Toradol given in office today.  Will discontinue oral Toradol and replaced with diclofenac to be used as needed.  Patient advised that he can also use Tylenol up to 1000 mg 3 times daily as needed.  If symptoms persist, will consider more advanced imaging and/or specialist referral.        REFERRALS:  Referral initiated to physical therapy ( [facility]  Physical Therapy & [facility] ) for evaluation and treatment.  Narcotic or pain medication Toradol 60 mg           Prescriptions:       [New Rx] diclofenac sodium 50 mg oral tablet, delayed release (enteric coated) [take 1 tablet (50 mg) by oral route 3 times per day as needed], #45 (forty five) tablets, Refills: 0 (zero)           Orders:       77287  Therapeutic injection  (In-House)              Toradol, per 15 mg  (x4)        RFPT  Physical Therapist Referral  (Send-Out)              Lumbago with sciatica, left side- See above        RADIOLOGY:  I have ordered Lumbar/Sacral Spine X-ray to be done today.            Orders:       [id number]  Radiologic examination, spine, lumbosacral;  minimum of four views  (Send-Out)              Elevated blood-pressure reading, without diagnosis of hypertension- Patient adamant that he would like to avoid starting medication. Will try lifestyle interventions for the next 4 weeks including improved diet, weight loss, alcohol avoidance, reduce sodium intake and exercise.  We will order labs today including CBC, CMP, lipid panel and TSH.  If blood pressure remains borderline, Like it is today, or elevated at next visit, will readdress starting medication.    LABORATORY:  Labs ordered to be performed today include CBC, Comprehensive metabolic panel, lipid panel, and TSH.            Orders:       24504  BDCBC [facility] CBC with 3 part diff  (Send-Out)            75152  COMP - [facility] Comp. Metabolic Panel  (Send-Out)            27563  DP [facility] Lipid Panel  (Send-Out)            22941  TSH - [facility] TSH  (Send-Out)              Encounter for screening for depression    MIPS PHQ-9 Depression Screening: Completed form scanned and in chart; Total Score 10; Positive Depression Screen but after further evaluation the patient does not have a diagnosis of depression.            Orders:         Depression screen negative  (In-House)                  Charge Capture:         Primary Diagnosis:      M25.552  Pain in left hip           Orders:      57395  Office visit - new pt, level 3  (In-House)            85774  Therapeutic injection  (In-House)              Toradol, per 15 mg  (x4)          M54.42  Lumbago with sciatica, left side     R03.0  Elevated blood-pressure reading, without diagnosis of hypertension     Z13.31  Encounter for screening for depression           Orders:        Depression screen negative  (In-House)

## 2021-05-18 NOTE — PROGRESS NOTES
Viral Bach  1950     Office/Outpatient Visit    Visit Date: Thu, Oct 8, 2020 09:22 am    Provider: Miah Meade MD (Assistant: Jesenia Robert MA)    Location: [facility]        Electronically signed by Miah Meade MD on  10/27/2020 01:38:35 PM                             Subjective:        CC: CONSENT BY [facility]    [phone] DOXIMITY VIDEOMr. Bach is a 70 year old White male.  He presents with headaches, stomach pain,dark urine and back pain.  Today's encounter is being done with a telehealth visit. He has consented verbally with two witnesses for todays treatment. Todays visit is being conducted by audio and video. Individuals present during the telemedicine consultation include patient and Dr. Meade         HPI:           Mr. Bach presents with essential (primary) hypertension.  He is not currently taking an antihypertensive.  He has not kept a blood pressure diary, but states that pressures have been too high.  He denies blurry vision, CP, SOB, edema or palpitations       Viral's primary reason for being evaluated today is that he is having lower abdominal pain that radiates through to his lower back.  This is been going on for several weeks.  Occurs intermittently and resolve spontaneously.  The pain when it does occur a sharp in character then becomes dull and persists for an hour or 2 before fading. He has not noticed any relationship to food intake.  He denies nausea, vomiting, diarrhea or constipation.  He does endorse significantly dark-colored urine and yellowing of his eyes.  He has no prior history of liver disease.  He denies excessive alcohol use.    ROS:     CONSTITUTIONAL:  Negative for chills, fatigue, fever, and weight change.      EYES:  Positive for icterus.   Negative for blurred vision.      CARDIOVASCULAR:  Negative for chest pain, dizziness, palpitations and edema.      RESPIRATORY:  Negative for dyspnea and cough.      GASTROINTESTINAL:  Positive for  abdominal pain ( epigastric; RUQ; periumbilical ).   Negative for constipation, diarrhea, heartburn, nausea or vomiting.      GENITOURINARY:  Positive for dark coloerd urine.   Negative for dysuria or hematuria.      INTEGUMENTARY:  Positive for jaundice and pruritis.      NEUROLOGICAL:  Positive for headaches.   Negative for paresthesias or weakness.      ENDOCRINE:  Negative for hair loss, heat/cold intolerance, polydipsia, and polyphagia.      PSYCHIATRIC:  Negative for anxiety, depression, and sleep disturbances.          Past Medical History / Family History / Social History:         Last Reviewed on 10/27/2020 01:36 PM by Miah Meade    Past Medical History:             PAST MEDICAL HISTORY     UNREMARKABLE         Surgical History:     NONE         Family History:         Positive for Coronary Artery Disease, Hypertension, Myocardial Infarction and Periph Vascular Disease;     Positive for Type 2 Diabetes;         Social History:     Occupation: ;     Marital Status: Single     Children: 3 children         Tobacco/Alcohol/Supplements:     Last Reviewed on 10/27/2020 01:36 PM by Miah Meade    Tobacco: Current Smoker: He currently smokes every day, 1 pack per day.          Substance Abuse History:     Last Reviewed on 10/27/2020 01:36 PM by Miah Meade    None         Mental Health History:     Last Reviewed on 10/27/2020 01:36 PM by Miah Meade    NEGATIVE         Communicable Diseases (eg STDs):     Last Reviewed on 10/27/2020 01:36 PM by Miah Meade    Reportable health conditions; NEGATIVE         Current Problems:     Last Reviewed on 10/27/2020 01:36 PM by Miah Meade    No Current Problems    Pain in left hip    Lumbago with sciatica, left side    Essential (primary) hypertension    Encounter for follow-up examination after completed treatment for conditions other than malignant neoplasm    Calculus of gallbladder and bile duct without cholecystitis with obstruction    Obstruction of  bile duct    Helicobacter pylori [H. pylori] as the cause of diseases classified elsewhere        Immunizations:     None        Allergies:     Last Reviewed on 10/27/2020 01:36 PM by Miah Meade    No Known Allergies.        Current Medications:     Last Reviewed on 10/27/2020 01:36 PM by Miah Meade    No Known Medications.    lansoprazole 30 mg oral capsule,delayed release (enteric coated) [take 1 capsule (30 mg) by oral route 2 times per day ]    hwhlykve-qngpmgzzpke-vlovitlwz 500-500-30 mg oral Combination Package [take as directed]        Objective:        Vitals:         Current: 10/8/2020 12:01:45 PM    Ht:  5 ft, 8 inBP: 157/74 mm Hg (left arm, sitting);  P: 60 bpm (left arm (BP Cuff), sitting);  sCr: 1.34 mg/dL;  GFR: 43.28        Exams:     PHYSICAL EXAM:     GENERAL: Vitals recorded well developed, well nourished;  no apparent distress;     EYES: Icterus present;     RESPIRATORY: normal respiratory rate and pattern with no distress;     SKIN: Diffuse jaundice present;     NEUROLOGIC: mental status: alert and oriented x 3; Grossly intact;     PSYCHIATRIC: appropriate affect and demeanor; normal speech pattern; Normal behavior;         Assessment:         I10   Essential (primary) hypertension       R10.9   Unspecified abdominal pain       R82.998   Other abnormal findings in urine           ORDERS:         Meds Prescribed:       [New Rx] lisinopriL 10 mg oral tablet [take 1 tablet (10 mg) by oral route once daily], #30 (thirty) tablets, Refills: 0 (zero)         Lab Orders:       [id number]  BDUAM - [facility] Urinalysis, automated, with micro  (Send-Out)            00955  BDCBC - [facility] CBC with 3 part diff  (Send-Out)            23260  COMP - [facility] Comp. Metabolic Panel  (Send-Out)            68315  HPUBT - [facility] H.pylori Breath test  (Send-Out)            97251  LIP - [facility] Lipase, Serum  (Send-Out)            08343  TSH - [facility] TSH  (Send-Out)                      Plan:         Essential (primary) hypertension- Newly  diagnosed.  Start lisinopril 10 mg daily.          Prescriptions:       [New Rx] lisinopriL 10 mg oral tablet [take 1 tablet (10 mg) by oral route once daily], #30 (thirty) tablets, Refills: 0 (zero)         Unspecified abdominal pain- Clinical picture is concerning for obstructive picture - Choledocholithiasis versus cholecystitis versus cholangitis versus pancreatic mass.  Basic labs ordered today.  Pending results, patient will likely require abdominal imaging (U/s vs CT vs MRCP). ED/return precautions given.     LABORATORY:  Labs ordered to be performed today include CBC, Comprehensive metabolic panel, H. pylori breath test, Lipase, and TSH.            Orders:       [id number]  BDCBC - [facility] CBC with 3 part diff  (Send-Out)            [id number]  COMP - [facility] Comp. Metabolic Panel  (Send-Out)            18837  HPUBT - [facility] H.pylori Breath test  (Send-Out)            43497  LIP - [facility] Lipase, Serum  (Send-Out)            22165  TSH - [facility] TSH  (Send-Out)              Other abnormal findings in urine- see above    LABORATORY:  Labs ordered to be performed today include urinalysis with micro.            Orders:       [id number]  [facility] Urinalysis, automated, with micro  (Send-Out)                  Charge Capture:         Primary Diagnosis:     I10  Essential (primary) hypertension           Orders:      [id number]  Office/outpatient visit; established patient, level 4  (In-House)              R10.9  Unspecified abdominal pain     R82.998  Other abnormal findings in urine

## 2021-07-02 VITALS
WEIGHT: 142.2 LBS | HEIGHT: 68 IN | DIASTOLIC BLOOD PRESSURE: 63 MMHG | HEART RATE: 60 BPM | SYSTOLIC BLOOD PRESSURE: 155 MMHG | BODY MASS INDEX: 21.55 KG/M2 | TEMPERATURE: 96.8 F

## 2021-07-02 VITALS
HEART RATE: 78 BPM | TEMPERATURE: 97.9 F | DIASTOLIC BLOOD PRESSURE: 76 MMHG | SYSTOLIC BLOOD PRESSURE: 137 MMHG | WEIGHT: 135.4 LBS | HEIGHT: 68 IN | BODY MASS INDEX: 20.52 KG/M2

## 2021-07-02 VITALS
WEIGHT: 147.4 LBS | SYSTOLIC BLOOD PRESSURE: 140 MMHG | DIASTOLIC BLOOD PRESSURE: 68 MMHG | TEMPERATURE: 97.4 F | HEIGHT: 68 IN | BODY MASS INDEX: 22.34 KG/M2 | HEART RATE: 69 BPM

## 2021-07-02 VITALS
DIASTOLIC BLOOD PRESSURE: 74 MMHG | HEIGHT: 68 IN | HEART RATE: 60 BPM | BODY MASS INDEX: 22.41 KG/M2 | SYSTOLIC BLOOD PRESSURE: 157 MMHG

## 2021-07-18 ENCOUNTER — HOSPITAL ENCOUNTER (EMERGENCY)
Dept: HOSPITAL 49 - FER | Age: 71
Discharge: HOME | End: 2021-07-18
Payer: COMMERCIAL

## 2021-07-18 DIAGNOSIS — S13.4XXA: Primary | ICD-10-CM
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
## 2021-07-26 PROBLEM — I10 HTN (HYPERTENSION): Status: ACTIVE | Noted: 2021-07-26
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
## 2021-07-27 ENCOUNTER — OFFICE VISIT (OUTPATIENT)
Dept: FAMILY MEDICINE CLINIC | Age: 71
End: 2021-07-27

## 2021-07-27 ENCOUNTER — LAB (OUTPATIENT)
Dept: LAB | Facility: HOSPITAL | Age: 71
End: 2021-07-27

## 2021-07-27 ENCOUNTER — HOSPITAL ENCOUNTER (OUTPATIENT)
Dept: GENERAL RADIOLOGY | Facility: HOSPITAL | Age: 71
Discharge: HOME | End: 2021-07-27

## 2021-07-27 VITALS
HEART RATE: 61 BPM | SYSTOLIC BLOOD PRESSURE: 176 MMHG | HEIGHT: 65 IN | DIASTOLIC BLOOD PRESSURE: 77 MMHG | BODY MASS INDEX: 25.12 KG/M2 | WEIGHT: 150.8 LBS
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
## 2021-07-27 DIAGNOSIS — M54.2 NECK PAIN: ICD-10-CM

## 2021-07-27 DIAGNOSIS — I10 ESSENTIAL HYPERTENSION: ICD-10-CM
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
## 2021-07-27 LAB
ANION GAP SERPL CALCULATED.3IONS-SCNC: 9 MMOL/L (ref 5–15)
BUN SERPL-MCNC: 13 MG/DL (ref 8–23)
BUN/CREAT SERPL: 8.1 (ref 7–25)
CALCIUM SPEC-SCNC: 9.7 MG/DL (ref 8.6–10.5)
CHLORIDE SERPL-SCNC: 102 MMOL/L (ref 98–107)
CO2 SERPL-SCNC: 27 MMOL/L (ref 22–29)
CREAT SERPL-MCNC: 1.61 MG/DL (ref 0.76–1.27)
GFR SERPL CREATININE-BSD FRML MDRD: 43 ML/MIN/1.73
GLUCOSE SERPL-MCNC: 92 MG/DL (ref 65–99)
POTASSIUM SERPL-SCNC: 5 MMOL/L (ref 3.5–5.2)
SODIUM SERPL-SCNC: 138 MMOL/L (ref 136–145)

## 2021-07-27 PROCEDURE — 72040 X-RAY EXAM NECK SPINE 2-3 VW: CPT

## 2021-07-27 PROCEDURE — 99214 OFFICE O/P EST MOD 30 MIN: CPT | Performed by: NURSE PRACTITIONER

## 2021-07-27 PROCEDURE — 80048 BASIC METABOLIC PNL TOTAL CA: CPT

## 2021-07-27 PROCEDURE — 36415 COLL VENOUS BLD VENIPUNCTURE: CPT

## 2021-07-27 NOTE — ASSESSMENT & PLAN NOTE
Patient is noncompliant with his blood pressure medication.  His blood pressure is high in office today.  I have recommended that he continue to take his blood pressure medication daily.  He is going to follow-up in 1 month to see how his blood pressure is.
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
## 2021-07-28 DIAGNOSIS — N18.32 STAGE 3B CHRONIC KIDNEY DISEASE (HCC): Primary | ICD-10-CM
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
## 2021-08-31 ENCOUNTER — OFFICE VISIT (OUTPATIENT)
Dept: FAMILY MEDICINE CLINIC | Age: 71
End: 2021-08-31

## 2021-08-31 VITALS
BODY MASS INDEX: 25.72 KG/M2 | DIASTOLIC BLOOD PRESSURE: 60 MMHG | WEIGHT: 154.4 LBS | HEART RATE: 56 BPM | HEIGHT: 65 IN | SYSTOLIC BLOOD PRESSURE: 150 MMHG

## 2021-08-31 DIAGNOSIS — I10 ESSENTIAL HYPERTENSION: Primary | ICD-10-CM

## 2021-08-31 DIAGNOSIS — N18.32 STAGE 3B CHRONIC KIDNEY DISEASE (HCC): ICD-10-CM

## 2021-08-31 DIAGNOSIS — N52.9 ERECTILE DYSFUNCTION, UNSPECIFIED ERECTILE DYSFUNCTION TYPE: ICD-10-CM

## 2021-08-31 PROCEDURE — 99213 OFFICE O/P EST LOW 20 MIN: CPT | Performed by: NURSE PRACTITIONER

## 2021-08-31 RX ORDER — SILDENAFIL 50 MG/1
50 TABLET, FILM COATED ORAL DAILY PRN
Qty: 4 TABLET | Refills: 0 | Status: SHIPPED | OUTPATIENT
Start: 2021-08-31 | End: 2021-08-31

## 2021-08-31 RX ORDER — SILDENAFIL 25 MG/1
25 TABLET, FILM COATED ORAL DAILY PRN
Qty: 4 TABLET | Refills: 0 | Status: SHIPPED | OUTPATIENT
Start: 2021-08-31 | End: 2021-11-16 | Stop reason: SDUPTHER

## 2021-08-31 RX ORDER — AMLODIPINE BESYLATE 5 MG/1
5 TABLET ORAL DAILY
COMMUNITY
End: 2021-12-20 | Stop reason: SDUPTHER

## 2021-09-14 ENCOUNTER — OFFICE VISIT (OUTPATIENT)
Dept: FAMILY MEDICINE CLINIC | Age: 71
End: 2021-09-14

## 2021-09-14 VITALS
BODY MASS INDEX: 25.33 KG/M2 | WEIGHT: 152 LBS | TEMPERATURE: 97.8 F | HEART RATE: 56 BPM | SYSTOLIC BLOOD PRESSURE: 194 MMHG | DIASTOLIC BLOOD PRESSURE: 75 MMHG | HEIGHT: 65 IN

## 2021-09-14 DIAGNOSIS — R42 DIZZINESS: ICD-10-CM

## 2021-09-14 DIAGNOSIS — R42 DIZZINESS: Primary | ICD-10-CM
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
## 2021-09-14 PROCEDURE — 99213 OFFICE O/P EST LOW 20 MIN: CPT | Performed by: FAMILY MEDICINE

## 2021-09-14 RX ORDER — MECLIZINE HCL 12.5 MG/1
12.5 TABLET ORAL 3 TIMES DAILY PRN
Qty: 30 TABLET | Refills: 0 | Status: SHIPPED | OUTPATIENT
Start: 2021-09-14

## 2021-09-14 RX ORDER — FLUTICASONE PROPIONATE 50 MCG
SPRAY, SUSPENSION (ML) NASAL
Qty: 48 G | OUTPATIENT
Start: 2021-09-14

## 2021-09-14 RX ORDER — FLUTICASONE PROPIONATE 50 MCG
2 SPRAY, SUSPENSION (ML) NASAL DAILY
Qty: 16 G | Refills: 0 | Status: SHIPPED | OUTPATIENT
Start: 2021-09-14

## 2021-09-14 NOTE — ASSESSMENT & PLAN NOTE
Viral is here with a 2-3 day history of dizziness, described primarily as vertigo but also having qualities of lightheadedness.  Symptoms were worst on Sunday and into Monday and have been better today.  His Augie-Hallpike test is negative bilaterally.  He does have some fluid behind the right eardrum.  I think it is most likely that he either has a mild labyrinth-itis versus possibly some vertigo stemming from the middle ear effusion on the right side.  I am going to give him some meclizine to use for the time being as needed and will also have him start on the Flonase nasal spray daily.  If he is not seeing any improvement at all in a week or 2 he is to call me back and let me know.

## 2021-09-14 NOTE — PROGRESS NOTES
"Chief Complaint  Dizziness (X 2 days)    Subjective          Viral Bach presents to [facility] today for dizziness for the past 2-3 days.  Sx started on Sunday, as soon as he woke up.  The dizziness is described as a lightheadedness.  He also complained of some headaches on Sunday, for which he took some Advil and that helped.  +Vertigo.  Today, his sx seemed to be better until he got up and started moving around.  He could just be walking to the bathroom and have it come on.  Today, as long as he lies still, the dizziness doesn't bother him.  No changes in hearing.  No fevers or chills.  No fatigue or malaise.  No N/V.  He has not had any extended periods of dizziness like this in the past.      Current Outpatient Medications:   •  amLODIPine (NORVASC) 5 MG tablet, Take 5 mg by mouth Daily., Disp: , Rfl:   •  sildenafil (Viagra) 25 MG tablet, Take 1 tablet by mouth Daily As Needed for Erectile Dysfunction., Disp: 4 tablet, Rfl: 0  •  fluticasone (FLONASE) 50 MCG/ACT nasal spray, 2 sprays into the nostril(s) as directed by provider Daily., Disp: 16 g, Rfl: 0  •  meclizine (ANTIVERT) 12.5 MG tablet, Take 1 tablet by mouth 3 (Three) Times a Day As Needed for Dizziness., Disp: 30 tablet, Rfl: 0    Allergies:  Patient has no known allergies.      Objective   Vital Signs:   BP (!) 194/75 (BP Location: Left arm, Patient Position: Sitting)   Pulse 56   Temp 97.8 °F (36.6 °C) (Oral)   Ht 165.1 cm (65\")   Wt 68.9 kg (152 lb)   BMI 25.29 kg/m²     Physical Exam  Vitals reviewed.   Constitutional:       General: He is not in acute distress.     Appearance: Normal appearance. He is well-developed.   HENT:      Head: Normocephalic and atraumatic.      Right Ear: Ear canal and external ear normal. A middle ear effusion is present.      Left Ear: Ear canal and external ear normal.  No middle ear effusion.      Ears:      Comments: Negative [place]-Hallpike bilaterally     Nose: Nose normal. "      Mouth/Throat:      Mouth: Mucous membranes are moist.   Eyes:      Extraocular Movements: Extraocular movements intact.      Conjunctiva/sclera: Conjunctivae normal.      Pupils: Pupils are equal, round, and reactive to light.   Cardiovascular:      Rate and Rhythm: Normal rate and regular rhythm.      Heart sounds: No murmur heard.     Pulmonary:      Effort: Pulmonary effort is normal.      Breath sounds: Normal breath sounds. No wheezing, rhonchi or rales.   Abdominal:      General: Bowel sounds are normal. There is no distension.      Palpations: Abdomen is soft.      Tenderness: There is no abdominal tenderness.   Musculoskeletal:         General: Normal range of motion.             Assessment and Plan    Diagnoses and all orders for this visit:    1. Dizziness (Primary)  Assessment & Plan:  Viral is here with a 2-3 day history of dizziness, described primarily as vertigo but also having qualities of lightheadedness.  Symptoms were worst on Sunday and into Monday and have been better today.  His Augie-Hallpike test is negative bilaterally.  He does have some fluid behind the right eardrum.  I think it is most likely that he either has a mild labyrinth-itis versus possibly some vertigo stemming from the middle ear effusion on the right side.  I am going to give him some meclizine to use for the time being as needed and will also have him start on the Flonase nasal spray daily.  If he is not seeing any improvement at all in a week or 2 he is to call me back and let me know.    Orders:  -     meclizine (ANTIVERT) 12.5 MG tablet; Take 1 tablet by mouth 3 (Three) Times a Day As Needed for Dizziness.  Dispense: 30 tablet; Refill: 0  -     fluticasone (FLONASE) 50 MCG/ACT nasal spray; 2 sprays into the nostril(s) as directed by provider Daily.  Dispense: 16 g; Refill: 0      Follow Up   Return if symptoms worsen or fail to improve.  Patient was given instructions and counseling regarding his condition or for  health maintenance advice. Please see specific information pulled into the AVS if appropriate.

## 2021-10-25 ENCOUNTER — TELEPHONE (OUTPATIENT)
Dept: FAMILY MEDICINE CLINIC | Age: 71
End: 2021-10-25

## 2021-10-25 RX ORDER — LISINOPRIL 5 MG/1
5 TABLET ORAL DAILY
Qty: 90 TABLET | Refills: 0 | Status: SHIPPED | OUTPATIENT
Start: 2021-10-25 | End: 2021-11-22 | Stop reason: SDUPTHER

## 2021-10-25 NOTE — TELEPHONE ENCOUNTER
Per last ov note pt wasn't sure which medication he was on, he thought amlodopine. Call placed to pharmacy, pt has never filled amlodopine however is taking lisinopril 5mg daily, refill sent.

## 2021-11-16 ENCOUNTER — TELEPHONE (OUTPATIENT)
Dept: FAMILY MEDICINE CLINIC | Age: 71
End: 2021-11-16

## 2021-11-16 DIAGNOSIS — N52.9 ERECTILE DYSFUNCTION, UNSPECIFIED ERECTILE DYSFUNCTION TYPE: ICD-10-CM

## 2021-11-16 RX ORDER — SILDENAFIL 25 MG/1
25 TABLET, FILM COATED ORAL DAILY PRN
Qty: 4 TABLET | Refills: 0 | Status: SHIPPED | OUTPATIENT
Start: 2021-11-16 | End: 2021-12-20 | Stop reason: SDUPTHER

## 2021-11-16 NOTE — TELEPHONE ENCOUNTER
Caller: Viral Bach    Relationship: Self    Best call back number: [phone]     Requested Prescriptions:   Requested Prescriptions     Pending Prescriptions Disp Refills   • sildenafil (Viagra) 25 MG tablet 4 tablet 0     Sig: Take 1 tablet by mouth Daily As Needed for Erectile Dysfunction.        Pharmacy where request should be sent: [facility] #[id number] - [place], [place] - [address] AT [facility] [address] [facility] [facility] - [phone]  - [phone] FX       Does the patient have less than a 3 day supply:  [x] Yes  [] No    Loretta Mock Rep   11/16/21 13:26 EST

## 2021-11-18 ENCOUNTER — TELEPHONE (OUTPATIENT)
Dept: FAMILY MEDICINE CLINIC | Age: 71
End: 2021-11-18

## 2021-11-18 NOTE — TELEPHONE ENCOUNTER
Caller: Viral Bach    Relationship: Self    Best call back number: [phone]    Requested Prescriptions:   sildenafil (Viagra) 25 MG tablet  PATIENT IS REQUESTING 50 MG, INSTEAD OF 25 MG    Pharmacy where request should be sent:    [facility] #[id number] - [place], [place] - [address] AT [facility] [address]  - [phone]  - [phone] FX  [phone]    Additional details provided by patient: PATIENT IS CURRENTLY OUT OF MEDICATION. HE STATED THAT [facility] HAD THE 25 MG, BUT HE IS REQUESTING 50 MG.     Does the patient have less than a 3 day supply:  [x] Yes  [] No    Loretta Ford Rep   11/18/21 10:41 EST

## 2021-11-19 PROBLEM — N52.8 OTHER MALE ERECTILE DYSFUNCTION: Status: ACTIVE | Noted: 2021-11-19

## 2021-11-19 PROBLEM — N18.32 STAGE 3B CHRONIC KIDNEY DISEASE (HCC): Status: ACTIVE | Noted: 2021-11-19

## 2021-11-19 NOTE — TELEPHONE ENCOUNTER
Please advise, patient to see you on 1/14/22 for [facility]. Patient would like to have Leilani Snyder as his PCP as he used to have Dr Meade as his previous PCP.       States would like medication Sildenafil 25mg increased to 50mg.     Per notes he needs a follow up to discuss when patient informed call disconnected.

## 2021-11-19 NOTE — PROGRESS NOTES
"Chief Complaint  Hypertension    Subjective          Viral Bach presents to [facility]  HTN: His blood pressure was not controlled.  He was unsure which medication that he was taking.  He was instructed to bring his medication bottles to his next visit. He states that his BP will run around 150s at home.  He smokes 1 PPD x 3-4 years.     Stage IIIb chronic kidney disease: He was referred to nephrology.  He reports that he had an appointment today, but canceled.     ED: He was prescribed sildanefil 25 mg daily.      Objective   Vital Signs:   BP (!) 148/102 (BP Location: Left arm, Patient Position: Sitting, Cuff Size: Adult)   Pulse 57   Temp 97.7 °F (36.5 °C) (Oral)   Ht 165.1 cm (65\")   Wt 69.6 kg (153 lb 6.4 oz)   BMI 25.53 kg/m²     Physical Exam  Constitutional:       General: He is not in acute distress.     Appearance: Normal appearance. He is normal weight.   HENT:      Head: Normocephalic.   Eyes:      Pupils: Pupils are equal, round, and reactive to light.      Visual Fields: Right eye visual fields normal and left eye visual fields normal.   Neck:      Trachea: Trachea normal.   Cardiovascular:      Rate and Rhythm: Normal rate and regular rhythm.      Heart sounds: Normal heart sounds.   Pulmonary:      Effort: Pulmonary effort is normal.      Breath sounds: Normal breath sounds and air entry.   Musculoskeletal:      Right lower leg: No edema.      Left lower leg: No edema.   Skin:     General: Skin is warm and dry.   Neurological:      Mental Status: He is alert and oriented to person, place, and time.   Psychiatric:         Mood and Affect: Mood and affect normal.         Behavior: Behavior normal.         Thought Content: Thought content normal.        Result Review :   The following data was reviewed by: ANA Fernandez on 11/22/2021:                  Assessment and Plan    Diagnoses and all orders for this visit:    1. Essential hypertension " (Primary)  Assessment & Plan:  His blood pressure is not controlled with amlodipine 5 mg daily and lisinopril 5 mg daily.  I have told the patient I am not comfortable prescribing him Viagra, as his blood pressure is not controlled.  He is to continue his amlodipine 5 mg daily, and I am going to increase his lisinopril to 20 mg daily.  He is going to follow-up in 4 weeks.  If his blood pressure is controlled, will consider refill of his Viagra.    Orders:  -     CBC (No Diff); Future  -     Comprehensive Metabolic Panel; Future  -     Lipid Panel; Future  -     lisinopril (PRINIVIL,ZESTRIL) 20 MG tablet; Take 1 tablet by mouth Daily.  Dispense: 30 tablet; Refill: 1    2. Stage 3b chronic kidney disease (HCC)  -     CBC (No Diff); Future  -     Comprehensive Metabolic Panel; Future  -     Lipid Panel; Future    3. Other male erectile dysfunction    4. Prostate cancer screening  -     PSA SCREENING; Future        Follow Up   Return in about 4 weeks (around 12/20/2021).  Patient was given instructions and counseling regarding his condition or for health maintenance advice. Please see specific information pulled into the AVS if appropriate.

## 2021-11-19 NOTE — TELEPHONE ENCOUNTER
He n/s his f/u appointment w/ me.  His BP was not controlled.  Will wait until he comes in for his f/u with Carol before refilling

## 2021-11-22 ENCOUNTER — OFFICE VISIT (OUTPATIENT)
Dept: FAMILY MEDICINE CLINIC | Age: 71
End: 2021-11-22

## 2021-11-22 VITALS
WEIGHT: 153.4 LBS | SYSTOLIC BLOOD PRESSURE: 148 MMHG | BODY MASS INDEX: 25.56 KG/M2 | HEART RATE: 57 BPM | TEMPERATURE: 97.7 F | DIASTOLIC BLOOD PRESSURE: 102 MMHG | HEIGHT: 65 IN

## 2021-11-22 DIAGNOSIS — N52.8 OTHER MALE ERECTILE DYSFUNCTION: ICD-10-CM

## 2021-11-22 DIAGNOSIS — N18.32 STAGE 3B CHRONIC KIDNEY DISEASE (HCC): ICD-10-CM

## 2021-11-22 DIAGNOSIS — I10 ESSENTIAL HYPERTENSION: Primary | ICD-10-CM

## 2021-11-22 DIAGNOSIS — I10 ESSENTIAL HYPERTENSION: ICD-10-CM

## 2021-11-22 DIAGNOSIS — Z12.5 PROSTATE CANCER SCREENING: ICD-10-CM

## 2021-11-22 PROCEDURE — 99213 OFFICE O/P EST LOW 20 MIN: CPT | Performed by: NURSE PRACTITIONER

## 2021-11-22 RX ORDER — LISINOPRIL 20 MG/1
20 TABLET ORAL DAILY
Qty: 30 TABLET | Refills: 1 | Status: SHIPPED | OUTPATIENT
Start: 2021-11-22

## 2021-11-22 NOTE — ASSESSMENT & PLAN NOTE
His blood pressure is not controlled with amlodipine 5 mg daily and lisinopril 5 mg daily.  I have told the patient I am not comfortable prescribing him Viagra, as his blood pressure is not controlled.  He is to continue his amlodipine 5 mg daily, and I am going to increase his lisinopril to 20 mg daily.  He is going to follow-up in 4 weeks.  If his blood pressure is controlled, will consider refill of his Viagra.

## 2021-11-23 RX ORDER — LISINOPRIL 20 MG/1
20 TABLET ORAL DAILY
Qty: 90 TABLET | OUTPATIENT
Start: 2021-11-23

## 2021-12-08 NOTE — PROGRESS NOTES
"Chief Complaint  Hypertension (4 week follow up, refill on Viagra)    Subjective          Viral Bach presents to [facility]  The patient returns for follow-up.    HTN: His blood pressure was not controlled at last office visit, so his lisinopril was increased to 20 mg daily.  He is currently taking amlodipine 5 mg daily as well.  His BP will vary from normal readings to SBP being 157.  He does smoke 1 PPD.       He reports that he teeth will hurt, which he reports that he does have bad teeth, which he needs to have looked at.  He reports that he will have a burning sensation in his epigastric region when this occurs.  He normally doesn't have problems w/ acid reflux.    ED: His blood pressure had been out of control, so his prescription of Viagra was not refilled at last office visit.    Stage IIIb CKD: He has been referred to nephrology.  He recently had labs done, which showed a normal GFR and creatinine.    Elevated blood sugar: He recently had labs done, which showed his blood sugar elevated at 121.      Objective   Vital Signs:   /62 (BP Location: Left arm, Patient Position: Sitting)   Pulse 62   Ht 165.1 cm (65\")   Wt 69.7 kg (153 lb 9.6 oz)   BMI 25.56 kg/m²     Physical Exam  Constitutional:       General: He is not in acute distress.     Appearance: Normal appearance. He is normal weight.   HENT:      Head: Normocephalic.   Eyes:      Pupils: Pupils are equal, round, and reactive to light.      Visual Fields: Right eye visual fields normal and left eye visual fields normal.   Neck:      Trachea: Trachea normal.   Cardiovascular:      Rate and Rhythm: Normal rate and regular rhythm.      Heart sounds: Normal heart sounds.   Pulmonary:      Effort: Pulmonary effort is normal.      Breath sounds: Normal breath sounds and air entry.   Musculoskeletal:      Right lower leg: No edema.      Left lower leg: No edema.   Skin:     General: Skin is warm and dry. "   Neurological:      Mental Status: He is alert and oriented to person, place, and time.   Psychiatric:         Mood and Affect: Mood and affect normal.         Behavior: Behavior normal.         Thought Content: Thought content normal.        Result Review :   The following data was reviewed by: ANA Fernandez on 12/20/2021:  Lipid Panel (12/15/2021 09:05)  Comprehensive Metabolic Panel (12/15/2021 09:05)  CBC (No Diff) (12/15/2021 09:05)  PSA SCREENING (12/15/2021 09:05)                   Assessment and Plan    Diagnoses and all orders for this visit:    1. Essential hypertension (Primary)  Assessment & Plan:  Blood pressure is better, but is not controlled.  He is to continue his lisinopril to 20 mg daily.  We will increase his amlodipine to 10 mg daily.    Orders:  -     amLODIPine (NORVASC) 10 MG tablet; Take 1 tablet by mouth Daily.  Dispense: 30 tablet; Refill: 1    2. Other male erectile dysfunction  Assessment & Plan:  Viagra refilled.      3. Stage 3b chronic kidney disease (HCC)  Assessment & Plan:  His last CMP showed normal kidney function.  We will continue to monitor.      4. Elevated blood sugar  Assessment & Plan:  Will check A1c.      5. Erectile dysfunction, unspecified erectile dysfunction type  Assessment & Plan:  Viagra refilled.    Orders:  -     sildenafil (Viagra) 25 MG tablet; Take 1 tablet by mouth Daily As Needed for Erectile Dysfunction.  Dispense: 10 tablet; Refill: 0    6. Gastroesophageal reflux disease, unspecified whether esophagitis present  -     omeprazole (priLOSEC) 40 MG capsule; Take 1 capsule by mouth Daily.  Dispense: 30 capsule; Refill: 1  Will give him a trial of omeprazole 40 mg daily to see if that will help with his burning in the epigastric region.      Follow Up   Return in about 4 weeks (around 1/17/2022).  Patient was given instructions and counseling regarding his condition or for health maintenance advice. Please see specific information pulled into the  AVS if appropriate.

## 2021-12-15 ENCOUNTER — LAB (OUTPATIENT)
Dept: LAB | Facility: HOSPITAL | Age: 71
End: 2021-12-15

## 2021-12-15 DIAGNOSIS — I10 ESSENTIAL HYPERTENSION: ICD-10-CM

## 2021-12-15 DIAGNOSIS — Z12.5 PROSTATE CANCER SCREENING: ICD-10-CM

## 2021-12-15 DIAGNOSIS — N18.32 STAGE 3B CHRONIC KIDNEY DISEASE (HCC): ICD-10-CM

## 2021-12-15 LAB
ALBUMIN SERPL-MCNC: 4.5 G/DL (ref 3.5–5.2)
ALBUMIN/GLOB SERPL: 1.5 G/DL
ALP SERPL-CCNC: 89 U/L (ref 39–117)
ALT SERPL W P-5'-P-CCNC: 14 U/L (ref 1–41)
ANION GAP SERPL CALCULATED.3IONS-SCNC: 7.7 MMOL/L (ref 5–15)
AST SERPL-CCNC: 19 U/L (ref 1–40)
BILIRUB SERPL-MCNC: 0.4 MG/DL (ref 0–1.2)
BUN SERPL-MCNC: 13 MG/DL (ref 8–23)
BUN/CREAT SERPL: 11 (ref 7–25)
CALCIUM SPEC-SCNC: 9.8 MG/DL (ref 8.6–10.5)
CHLORIDE SERPL-SCNC: 103 MMOL/L (ref 98–107)
CHOLEST SERPL-MCNC: 153 MG/DL (ref 0–200)
CO2 SERPL-SCNC: 27.3 MMOL/L (ref 22–29)
CREAT SERPL-MCNC: 1.18 MG/DL (ref 0.76–1.27)
DEPRECATED RDW RBC AUTO: 41.7 FL (ref 37–54)
ERYTHROCYTE [DISTWIDTH] IN BLOOD BY AUTOMATED COUNT: 11.9 % (ref 12.3–15.4)
GFR SERPL CREATININE-BSD FRML MDRD: 61 ML/MIN/1.73
GLOBULIN UR ELPH-MCNC: 3 GM/DL
GLUCOSE SERPL-MCNC: 121 MG/DL (ref 65–99)
HCT VFR BLD AUTO: 47.3 % (ref 37.5–51)
HDLC SERPL-MCNC: 54 MG/DL (ref 40–60)
HGB BLD-MCNC: 16.2 G/DL (ref 13–17.7)
LDLC SERPL CALC-MCNC: 87 MG/DL (ref 0–100)
LDLC/HDLC SERPL: 1.61 {RATIO}
MCH RBC QN AUTO: 32.1 PG (ref 26.6–33)
MCHC RBC AUTO-ENTMCNC: 34.2 G/DL (ref 31.5–35.7)
MCV RBC AUTO: 93.7 FL (ref 79–97)
PLATELET # BLD AUTO: 246 10*3/MM3 (ref 140–450)
PMV BLD AUTO: 10.3 FL (ref 6–12)
POTASSIUM SERPL-SCNC: 4.8 MMOL/L (ref 3.5–5.2)
PROT SERPL-MCNC: 7.5 G/DL (ref 6–8.5)
PSA SERPL-MCNC: 2.1 NG/ML (ref 0–4)
RBC # BLD AUTO: 5.05 10*6/MM3 (ref 4.14–5.8)
SODIUM SERPL-SCNC: 138 MMOL/L (ref 136–145)
TRIGL SERPL-MCNC: 59 MG/DL (ref 0–150)
VLDLC SERPL-MCNC: 12 MG/DL (ref 5–40)
WBC NRBC COR # BLD: 9.51 10*3/MM3 (ref 3.4–10.8)

## 2021-12-15 PROCEDURE — 85027 COMPLETE CBC AUTOMATED: CPT

## 2021-12-15 PROCEDURE — 36415 COLL VENOUS BLD VENIPUNCTURE: CPT

## 2021-12-15 PROCEDURE — 80061 LIPID PANEL: CPT

## 2021-12-15 PROCEDURE — 80053 COMPREHEN METABOLIC PANEL: CPT

## 2021-12-15 PROCEDURE — G0103 PSA SCREENING: HCPCS

## 2021-12-20 ENCOUNTER — LAB (OUTPATIENT)
Dept: LAB | Facility: HOSPITAL | Age: 71
End: 2021-12-20

## 2021-12-20 ENCOUNTER — OFFICE VISIT (OUTPATIENT)
Dept: FAMILY MEDICINE CLINIC | Age: 71
End: 2021-12-20

## 2021-12-20 VITALS
WEIGHT: 153.6 LBS | BODY MASS INDEX: 25.59 KG/M2 | DIASTOLIC BLOOD PRESSURE: 62 MMHG | SYSTOLIC BLOOD PRESSURE: 162 MMHG | HEART RATE: 62 BPM | HEIGHT: 65 IN

## 2021-12-20 DIAGNOSIS — R73.9 ELEVATED BLOOD SUGAR: ICD-10-CM

## 2021-12-20 DIAGNOSIS — N52.9 ERECTILE DYSFUNCTION, UNSPECIFIED ERECTILE DYSFUNCTION TYPE: ICD-10-CM

## 2021-12-20 DIAGNOSIS — R73.9 ELEVATED BLOOD SUGAR: Primary | ICD-10-CM

## 2021-12-20 DIAGNOSIS — K21.9 GASTROESOPHAGEAL REFLUX DISEASE, UNSPECIFIED WHETHER ESOPHAGITIS PRESENT: ICD-10-CM

## 2021-12-20 DIAGNOSIS — I10 ESSENTIAL HYPERTENSION: ICD-10-CM

## 2021-12-20 DIAGNOSIS — N18.32 STAGE 3B CHRONIC KIDNEY DISEASE (HCC): ICD-10-CM

## 2021-12-20 DIAGNOSIS — I10 ESSENTIAL HYPERTENSION: Primary | ICD-10-CM

## 2021-12-20 DIAGNOSIS — N52.8 OTHER MALE ERECTILE DYSFUNCTION: ICD-10-CM

## 2021-12-20 LAB — HBA1C MFR BLD: 5.91 % (ref 4.8–5.6)

## 2021-12-20 PROCEDURE — 36415 COLL VENOUS BLD VENIPUNCTURE: CPT

## 2021-12-20 PROCEDURE — 99214 OFFICE O/P EST MOD 30 MIN: CPT | Performed by: NURSE PRACTITIONER

## 2021-12-20 PROCEDURE — 83036 HEMOGLOBIN GLYCOSYLATED A1C: CPT

## 2021-12-20 RX ORDER — AMLODIPINE BESYLATE 10 MG/1
10 TABLET ORAL DAILY
Qty: 90 TABLET | Refills: 1 | Status: SHIPPED | OUTPATIENT
Start: 2021-12-20 | End: 2022-06-22

## 2021-12-20 RX ORDER — OMEPRAZOLE 40 MG/1
40 CAPSULE, DELAYED RELEASE ORAL DAILY
Qty: 30 CAPSULE | Refills: 1 | Status: SHIPPED | OUTPATIENT
Start: 2021-12-20 | End: 2021-12-20

## 2021-12-20 RX ORDER — SILDENAFIL 25 MG/1
25 TABLET, FILM COATED ORAL DAILY PRN
Qty: 10 TABLET | Refills: 0 | Status: SHIPPED | OUTPATIENT
Start: 2021-12-20 | End: 2022-01-26 | Stop reason: SDUPTHER

## 2021-12-20 RX ORDER — AMLODIPINE BESYLATE 10 MG/1
10 TABLET ORAL DAILY
Qty: 30 TABLET | Refills: 1 | Status: SHIPPED | OUTPATIENT
Start: 2021-12-20 | End: 2021-12-20

## 2021-12-20 RX ORDER — OMEPRAZOLE 40 MG/1
40 CAPSULE, DELAYED RELEASE ORAL DAILY
Qty: 90 CAPSULE | Refills: 1 | Status: SHIPPED | OUTPATIENT
Start: 2021-12-20 | End: 2022-06-22

## 2021-12-20 NOTE — ASSESSMENT & PLAN NOTE
Blood pressure is better, but is not controlled.  He is to continue his lisinopril to 20 mg daily.  We will increase his amlodipine to 10 mg daily.

## 2022-01-21 PROBLEM — R73.9 ELEVATED BLOOD SUGAR: Status: RESOLVED | Noted: 2021-12-20 | Resolved: 2022-01-21

## 2022-01-21 PROBLEM — R73.03 PREDIABETES: Status: ACTIVE | Noted: 2022-01-21

## 2022-01-21 NOTE — PROGRESS NOTES
"Chief Complaint  Hypertension (follow up)    Subjective          Viral Bach presents to [facility]  Patient returns for follow-up.    HTN: His blood pressure has not been controlled.  His amlodipine was increased to 10 mg daily to take in addition to his lisinopril 20 mg daily at his last office visit.  BP has improved some, but it is still elevated. Does smoke 1 PPD. Drinks 2-3 cups of coffee and drinks Pepsi during the day. Denies chest pain, blurred vision, H/A, and pedal edema.     Stage IIIb chronic kidney disease: His last CMP showed normal kidney function, but he does have an appointment in February with a nephrologist.    Prediabetes: His A1c was 5.91, so was recommended to undergo lifestyle modifications.    GERD: He was given a trial of omeprazole 40 mg daily at last office visit to see if that would help with his burning sensation in his epigastric region.  He reports that it didn't really help the burning sensation. He gets that sensation only when he does physical work and will only last for about 10 minutes and go away.     ED: he would like a refill of his Viagra and prefers 50 mg. He reports that 25 mg is not effective.       Objective   Vital Signs:   /64 (BP Location: Right arm, Patient Position: Sitting)   Pulse 70   Temp 97.6 °F (36.4 °C) (Oral)   Ht 165.1 cm (65\")   Wt 67.7 kg (149 lb 3.2 oz)   SpO2 97% Comment: room air  BMI 24.83 kg/m²     Physical Exam  Constitutional:       General: He is not in acute distress.     Appearance: Normal appearance. He is normal weight.   HENT:      Head: Normocephalic.   Eyes:      Pupils: Pupils are equal, round, and reactive to light.      Visual Fields: Right eye visual fields normal and left eye visual fields normal.   Neck:      Trachea: Trachea normal.   Cardiovascular:      Rate and Rhythm: Normal rate and regular rhythm.      Heart sounds: Normal heart sounds.   Pulmonary:      Effort: Pulmonary effort " is normal.      Breath sounds: Normal breath sounds and air entry.   Musculoskeletal:      Right lower leg: No edema.      Left lower leg: No edema.   Skin:     General: Skin is warm and dry.   Neurological:      Mental Status: He is alert and oriented to person, place, and time.   Psychiatric:         Mood and Affect: Mood and affect normal.         Behavior: Behavior normal.         Thought Content: Thought content normal.        Result Review :   The following data was reviewed by: ANA Fernandez on 01/26/2022:  Hemoglobin A1c (12/20/2021 16:05)  Lipid Panel (12/15/2021 09:05)  Comprehensive Metabolic Panel (12/15/2021 09:05)  CBC (No Diff) (12/15/2021 09:05)  PSA SCREENING (12/15/2021 09:05)                    Assessment and Plan    Diagnoses and all orders for this visit:    1. Essential hypertension (Primary)  Assessment & Plan:  Hypertension is improving with treatment.  Continue current treatment regimen.  Dietary sodium restriction.  Regular aerobic exercise.  Stop smoking.  Continue current medications.  Blood pressure will be reassessed in 3 months.    Continue lisinopril 20 mg daily and amlodipine 10 mg daily.      2. Prediabetes  Assessment & Plan:  Continue lifestyle modifications.      3. Stage 3b chronic kidney disease (HCC)  Assessment & Plan:  Keep follow-up appointment with nephrology in February      4. Gastroesophageal reflux disease, unspecified whether esophagitis present    5. Erectile dysfunction, unspecified erectile dysfunction type  Assessment & Plan:  Continue Viagra 50 mg as needed    Orders:  -     sildenafil (Viagra) 50 MG tablet; Take 1 tablet by mouth Daily As Needed for Erectile Dysfunction.  Dispense: 10 tablet; Refill: 0        Follow Up   Return in about 3 months (around 4/26/2022).  Patient was given instructions and counseling regarding his condition or for health maintenance advice. Please see specific information pulled into the AVS if appropriate.

## 2022-01-26 ENCOUNTER — OFFICE VISIT (OUTPATIENT)
Dept: FAMILY MEDICINE CLINIC | Age: 72
End: 2022-01-26

## 2022-01-26 VITALS
BODY MASS INDEX: 24.86 KG/M2 | HEART RATE: 70 BPM | TEMPERATURE: 97.6 F | WEIGHT: 149.2 LBS | OXYGEN SATURATION: 97 % | DIASTOLIC BLOOD PRESSURE: 64 MMHG | HEIGHT: 65 IN | SYSTOLIC BLOOD PRESSURE: 152 MMHG

## 2022-01-26 DIAGNOSIS — N52.9 ERECTILE DYSFUNCTION, UNSPECIFIED ERECTILE DYSFUNCTION TYPE: ICD-10-CM

## 2022-01-26 DIAGNOSIS — N18.32 STAGE 3B CHRONIC KIDNEY DISEASE: ICD-10-CM

## 2022-01-26 DIAGNOSIS — I10 ESSENTIAL HYPERTENSION: Primary | ICD-10-CM

## 2022-01-26 DIAGNOSIS — R73.03 PREDIABETES: ICD-10-CM

## 2022-01-26 DIAGNOSIS — K21.9 GASTROESOPHAGEAL REFLUX DISEASE, UNSPECIFIED WHETHER ESOPHAGITIS PRESENT: ICD-10-CM

## 2022-01-26 PROCEDURE — 99214 OFFICE O/P EST MOD 30 MIN: CPT | Performed by: NURSE PRACTITIONER

## 2022-01-26 RX ORDER — SILDENAFIL 50 MG/1
50 TABLET, FILM COATED ORAL DAILY PRN
Qty: 10 TABLET | Refills: 0 | Status: SHIPPED | OUTPATIENT
Start: 2022-01-26 | End: 2022-03-18

## 2022-01-26 NOTE — ASSESSMENT & PLAN NOTE
Hypertension is improving with treatment.  Continue current treatment regimen.  Dietary sodium restriction.  Regular aerobic exercise.  Stop smoking.  Continue current medications.  Blood pressure will be reassessed in 3 months.    Continue lisinopril 20 mg daily and amlodipine 10 mg daily.

## 2022-03-18 DIAGNOSIS — N52.9 ERECTILE DYSFUNCTION, UNSPECIFIED ERECTILE DYSFUNCTION TYPE: ICD-10-CM

## 2022-03-18 RX ORDER — SILDENAFIL 50 MG/1
50 TABLET, FILM COATED ORAL DAILY PRN
Qty: 10 TABLET | Refills: 0 | Status: SHIPPED | OUTPATIENT
Start: 2022-03-18 | End: 2022-07-11

## 2022-06-22 DIAGNOSIS — K21.9 GASTROESOPHAGEAL REFLUX DISEASE, UNSPECIFIED WHETHER ESOPHAGITIS PRESENT: ICD-10-CM

## 2022-06-22 DIAGNOSIS — I10 ESSENTIAL HYPERTENSION: ICD-10-CM

## 2022-06-22 RX ORDER — AMLODIPINE BESYLATE 10 MG/1
10 TABLET ORAL DAILY
Qty: 90 TABLET | Refills: 0 | Status: SHIPPED | OUTPATIENT
Start: 2022-06-22 | End: 2022-09-20

## 2022-06-22 RX ORDER — OMEPRAZOLE 40 MG/1
40 CAPSULE, DELAYED RELEASE ORAL DAILY
Qty: 90 CAPSULE | Refills: 0 | Status: SHIPPED | OUTPATIENT
Start: 2022-06-22

## 2022-07-08 DIAGNOSIS — N52.9 ERECTILE DYSFUNCTION, UNSPECIFIED ERECTILE DYSFUNCTION TYPE: ICD-10-CM

## 2022-07-11 RX ORDER — SILDENAFIL 50 MG/1
50 TABLET, FILM COATED ORAL DAILY PRN
Qty: 10 TABLET | Refills: 0 | Status: SHIPPED | OUTPATIENT
Start: 2022-07-11 | End: 2022-11-11 | Stop reason: SDUPTHER

## 2022-09-20 DIAGNOSIS — I10 ESSENTIAL HYPERTENSION: ICD-10-CM

## 2022-09-20 RX ORDER — AMLODIPINE BESYLATE 10 MG/1
10 TABLET ORAL DAILY
Qty: 30 TABLET | Refills: 0 | Status: SHIPPED | OUTPATIENT
Start: 2022-09-20

## 2022-11-07 ENCOUNTER — TELEPHONE (OUTPATIENT)
Dept: FAMILY MEDICINE CLINIC | Age: 72
End: 2022-11-07

## 2022-11-07 DIAGNOSIS — N52.9 ERECTILE DYSFUNCTION, UNSPECIFIED ERECTILE DYSFUNCTION TYPE: ICD-10-CM

## 2022-11-07 RX ORDER — SILDENAFIL 50 MG/1
50 TABLET, FILM COATED ORAL DAILY PRN
Qty: 10 TABLET | Refills: 0 | OUTPATIENT
Start: 2022-11-07

## 2022-11-07 RX ORDER — SILDENAFIL 50 MG/1
50 TABLET, FILM COATED ORAL DAILY PRN
Qty: 10 TABLET | Refills: 0 | Status: CANCELLED | OUTPATIENT
Start: 2022-11-07

## 2022-11-07 NOTE — TELEPHONE ENCOUNTER
Rx Refill Note  Requested Prescriptions     Pending Prescriptions Disp Refills   • sildenafil (VIAGRA) 50 MG tablet [Pharmacy Med Name: SILDENAFIL 50MG TABLETS] 10 tablet 0     Sig: TAKE 1 TABLET BY MOUTH DAILY AS NEEDED FOR ERECTILE DYSFUNCTION      Last office visit with prescribing clinician: 1/26/2022      Next office visit with prescribing clinician: Visit date not found            Trish Dent LPN  11/07/22, 15:29 EST

## 2022-11-07 NOTE — TELEPHONE ENCOUNTER
Caller: Viral Bach    Relationship: Self    Best call back number: [phone]    Requested Prescriptions:   Requested Prescriptions     Pending Prescriptions Disp Refills   • sildenafil (VIAGRA) 50 MG tablet 10 tablet 0     Sig: Take 1 tablet by mouth Daily As Needed for Erectile Dysfunction.        Pharmacy where request should be sent: [facility] #[id number] - [place], [place] - [address] AT [facility] [address] [facility] - [phone]  - [phone] FX         Does the patient have less than a 3 day supply:  [x] Yes  [] No    Loretta Gr Rep   11/07/22 15:37 EST

## 2022-11-08 RX ORDER — SILDENAFIL 50 MG/1
50 TABLET, FILM COATED ORAL DAILY PRN
Qty: 10 TABLET | Refills: 0 | Status: CANCELLED | OUTPATIENT
Start: 2022-11-08

## 2022-11-08 NOTE — TELEPHONE ENCOUNTER
Pt called and scheduled f/u appt for 11/11/22 and wanting to know if this can be refilled prior to appt, please adv.

## 2022-11-08 NOTE — PROGRESS NOTES
"Chief Complaint  Hypertension (Follow up)    Subjective          Viral Bach presents to [facility]  History of Present Illness  HTN: He is taking lisinopril 20 mg daily and amlodipine 10 mg daily. He checks his BP at home, and it will run normal. No chest pain, blurred vision, dizziness, and pedal edema. He is still smoking. He smokes at least 1 PPD x 51. He is interested in lung cancer screening, but wants to decline at this time due to going back to work. If he changes his mind, he will let me know.    Prediabetes: His A1c was 5.9 when it was checked in December.    Stage IIIb CKD: He has seen nephrology in the past.    ED: He is taking Viagra 50 mg as needed. He would like a higher dose.      Objective   Vital Signs:   /64 (BP Location: Right arm, Patient Position: Sitting)   Pulse 57   Ht 165.1 cm (65\")   Wt 66.8 kg (147 lb 3.2 oz)   BMI 24.50 kg/m²     Physical Exam  Constitutional:       General: He is not in acute distress.     Appearance: Normal appearance. He is normal weight.   HENT:      Head: Normocephalic.   Eyes:      Pupils: Pupils are equal, round, and reactive to light.      Visual Fields: Right eye visual fields normal and left eye visual fields normal.   Neck:      Trachea: Trachea normal.   Cardiovascular:      Rate and Rhythm: Regular rhythm. Bradycardia present.      Heart sounds: Normal heart sounds.   Pulmonary:      Effort: Pulmonary effort is normal.      Breath sounds: Normal breath sounds and air entry.   Musculoskeletal:      Right lower leg: No edema.      Left lower leg: No edema.   Skin:     General: Skin is warm and dry.   Neurological:      Mental Status: He is alert and oriented to person, place, and time.   Psychiatric:         Mood and Affect: Mood and affect normal.         Behavior: Behavior normal.         Thought Content: Thought content normal.        Result Review :   The following data was reviewed by: ANA Fernandez " on 11/11/2022:  PSA SCREENING (12/15/2021 09:05)  CBC (No Diff) (12/15/2021 09:05)  Comprehensive Metabolic Panel (12/15/2021 09:05)  Lipid Panel (12/15/2021 09:05)  Hemoglobin A1c (12/20/2021 16:05)                   Assessment and Plan    Diagnoses and all orders for this visit:    1. Essential hypertension (Primary)  Assessment & Plan:  His blood pressure is normal at home.  Continue lisinopril 20 mg daily and amlodipine 10 mg daily.  I do recommend that he stop smoking.    Orders:  -     CBC (No Diff); Future  -     Comprehensive Metabolic Panel; Future  -     Lipid Panel; Future    2. Prediabetes  Assessment & Plan:  We will check an A1c.    Orders:  -     CBC (No Diff); Future  -     Comprehensive Metabolic Panel; Future  -     Lipid Panel; Future  -     Hemoglobin A1c; Future    3. Stage 3b chronic kidney disease (HCC)  Assessment & Plan:  We will check an A1c.    Orders:  -     Comprehensive Metabolic Panel; Future    4. Erectile dysfunction, unspecified erectile dysfunction type  Assessment & Plan:  We will increase his senna filled to 100 mg as needed.    Orders:  -     Discontinue: sildenafil (VIAGRA) 50 MG tablet; Take 1 tablet by mouth Daily As Needed for Erectile Dysfunction.  Dispense: 10 tablet; Refill: 3  -     sildenafil (VIAGRA) 100 MG tablet; Take 1 tablet by mouth Daily As Needed for Erectile Dysfunction.  Dispense: 4 tablet; Refill: 5    5. Tobacco use    6. Colon cancer screening  -     Cologuard - Stool, Per Rectum; Future  He will let me know if he is interested in a lung cancer screening.      Follow Up   Return in about 6 months (around 5/11/2023).  Patient was given instructions and counseling regarding his condition or for health maintenance advice. Please see specific information pulled into the AVS if appropriate.

## 2022-11-11 ENCOUNTER — OFFICE VISIT (OUTPATIENT)
Dept: FAMILY MEDICINE CLINIC | Age: 72
End: 2022-11-11

## 2022-11-11 VITALS
HEIGHT: 65 IN | BODY MASS INDEX: 24.53 KG/M2 | HEART RATE: 57 BPM | WEIGHT: 147.2 LBS | DIASTOLIC BLOOD PRESSURE: 64 MMHG | SYSTOLIC BLOOD PRESSURE: 156 MMHG

## 2022-11-11 DIAGNOSIS — N52.9 ERECTILE DYSFUNCTION, UNSPECIFIED ERECTILE DYSFUNCTION TYPE: ICD-10-CM

## 2022-11-11 DIAGNOSIS — Z72.0 TOBACCO USE: ICD-10-CM

## 2022-11-11 DIAGNOSIS — Z12.11 COLON CANCER SCREENING: ICD-10-CM

## 2022-11-11 DIAGNOSIS — N18.32 STAGE 3B CHRONIC KIDNEY DISEASE: ICD-10-CM

## 2022-11-11 DIAGNOSIS — I10 ESSENTIAL HYPERTENSION: Primary | ICD-10-CM

## 2022-11-11 DIAGNOSIS — R73.03 PREDIABETES: ICD-10-CM

## 2022-11-11 PROCEDURE — 99214 OFFICE O/P EST MOD 30 MIN: CPT | Performed by: NURSE PRACTITIONER

## 2022-11-11 RX ORDER — SILDENAFIL 100 MG/1
100 TABLET, FILM COATED ORAL DAILY PRN
Qty: 4 TABLET | Refills: 5 | Status: SHIPPED | OUTPATIENT
Start: 2022-11-11

## 2022-11-11 RX ORDER — SILDENAFIL 50 MG/1
50 TABLET, FILM COATED ORAL DAILY PRN
Qty: 10 TABLET | Refills: 3 | Status: SHIPPED | OUTPATIENT
Start: 2022-11-11 | End: 2022-11-11 | Stop reason: SDUPTHER

## 2022-11-11 NOTE — ASSESSMENT & PLAN NOTE
His blood pressure is normal at home.  Continue lisinopril 20 mg daily and amlodipine 10 mg daily.  I do recommend that he stop smoking.

## 2022-11-22 ENCOUNTER — TELEPHONE (OUTPATIENT)
Dept: FAMILY MEDICINE CLINIC | Age: 72
End: 2022-11-22

## 2022-11-30 NOTE — TELEPHONE ENCOUNTER
11/30/22 spoke to pt re overdue labs ordered on 11/11/22 by SERG Snyder. 2nd attempt & letter sent

## 2023-01-11 ENCOUNTER — TELEPHONE (OUTPATIENT)
Dept: FAMILY MEDICINE CLINIC | Age: 73
End: 2023-01-11
Payer: MEDICARE

## 2023-01-11 NOTE — TELEPHONE ENCOUNTER
----- Message from Linda Ruiz LPN sent at 1/11/2023  8:17 AM EST -----      ----- Message -----  From: SYSTEM  Sent: 1/11/2023   1:13 AM EST  To: [facility] Anthony Chao [facility]

## 2023-06-01 DIAGNOSIS — N52.9 ERECTILE DYSFUNCTION, UNSPECIFIED ERECTILE DYSFUNCTION TYPE: ICD-10-CM

## 2023-06-02 RX ORDER — SILDENAFIL 100 MG/1
100 TABLET, FILM COATED ORAL DAILY PRN
Qty: 4 TABLET | Refills: 5 | Status: SHIPPED | OUTPATIENT
Start: 2023-06-02

## 2023-08-01 ENCOUNTER — TELEPHONE (OUTPATIENT)
Dept: FAMILY MEDICINE CLINIC | Age: 73
End: 2023-08-01

## 2023-11-29 DIAGNOSIS — N52.9 ERECTILE DYSFUNCTION, UNSPECIFIED ERECTILE DYSFUNCTION TYPE: ICD-10-CM

## 2023-11-29 RX ORDER — SILDENAFIL 100 MG/1
100 TABLET, FILM COATED ORAL DAILY PRN
Qty: 4 TABLET | Refills: 5 | OUTPATIENT
Start: 2023-11-29

## 2023-12-04 ENCOUNTER — OFFICE VISIT (OUTPATIENT)
Dept: FAMILY MEDICINE CLINIC | Age: 73
End: 2023-12-04
Payer: MEDICARE

## 2023-12-04 ENCOUNTER — TELEPHONE (OUTPATIENT)
Dept: FAMILY MEDICINE CLINIC | Age: 73
End: 2023-12-04

## 2023-12-04 VITALS
SYSTOLIC BLOOD PRESSURE: 160 MMHG | HEART RATE: 67 BPM | BODY MASS INDEX: 23.86 KG/M2 | HEIGHT: 65 IN | DIASTOLIC BLOOD PRESSURE: 80 MMHG | WEIGHT: 143.2 LBS

## 2023-12-04 DIAGNOSIS — I10 ESSENTIAL HYPERTENSION: Primary | ICD-10-CM

## 2023-12-04 DIAGNOSIS — Z12.5 PROSTATE CANCER SCREENING: ICD-10-CM

## 2023-12-04 DIAGNOSIS — N52.9 ERECTILE DYSFUNCTION, UNSPECIFIED ERECTILE DYSFUNCTION TYPE: ICD-10-CM

## 2023-12-04 DIAGNOSIS — I10 ESSENTIAL HYPERTENSION: ICD-10-CM

## 2023-12-04 DIAGNOSIS — R73.03 PREDIABETES: ICD-10-CM

## 2023-12-04 PROCEDURE — 99213 OFFICE O/P EST LOW 20 MIN: CPT | Performed by: NURSE PRACTITIONER

## 2023-12-04 PROCEDURE — 3079F DIAST BP 80-89 MM HG: CPT | Performed by: NURSE PRACTITIONER

## 2023-12-04 PROCEDURE — 1160F RVW MEDS BY RX/DR IN RCRD: CPT | Performed by: NURSE PRACTITIONER

## 2023-12-04 PROCEDURE — 3077F SYST BP >= 140 MM HG: CPT | Performed by: NURSE PRACTITIONER

## 2023-12-04 PROCEDURE — 1159F MED LIST DOCD IN RCRD: CPT | Performed by: NURSE PRACTITIONER

## 2023-12-04 RX ORDER — AMLODIPINE BESYLATE 10 MG/1
10 TABLET ORAL DAILY
Qty: 30 TABLET | Refills: 0 | Status: SHIPPED | OUTPATIENT
Start: 2023-12-04 | End: 2023-12-11 | Stop reason: SDUPTHER

## 2023-12-04 RX ORDER — LISINOPRIL 20 MG/1
20 TABLET ORAL DAILY
Qty: 30 TABLET | Refills: 0 | Status: SHIPPED | OUTPATIENT
Start: 2023-12-04 | End: 2023-12-11 | Stop reason: SDUPTHER

## 2023-12-04 NOTE — ASSESSMENT & PLAN NOTE
He reports that he has not been taking his blood pressure medication for several months.  I will send in a short fill of his lisinopril 20 mg daily and amlodipine 10 mg daily.  He has not had any lab work in almost 2 years.  We have discussed that he needs to take his blood pressure medication and log his readings for 2 weeks and call my nurse with the results.  He is also to obtain his blood work as well.  We have discussed may consider restarting him back on sildenafil based on what his blood pressure readings are and what his lab results are.

## 2023-12-04 NOTE — TELEPHONE ENCOUNTER
Provider: RAYMON HEATON    Caller: MASON THOMPSON    Relationship to Patient: SELF  Pharmacy:     Phone Number:     [phone]       Reason for Call: PATIENT WOULD LIKE FOR RAYMON MARIE TO CALL HIM TO GET A PRESCRIPTION REFILLED, BUT WOULD NOT GIVE ANY INFORMATION. HE WANTS TO TALK TO MS. MARIE.

## 2023-12-04 NOTE — TELEPHONE ENCOUNTER
Pt would like to have med filled without appt, spoke to provider who stated pt would need to keep appt as he has not been seen in over a year, did not complete last labs ordered, no labs in chart for 2 years. Pt inf and stated he would be here at appt.

## 2023-12-04 NOTE — PROGRESS NOTES
"  Chief Complaint  Hypertension (Med refills/)    Subjective          Viral Bach presents to [facility]  History of Present Illness  Patient returns after period of absence.    HTN: He is taking lisinopril 20 mg daily and amlodipine 10 mg daily.  Lab orders were placed at his last office visit, but he never completed those.  He hasn't taken his medication in several months.     Prediabetes: His last A1c was 5.91 when it was checked December 2021.    ED: He is taking sildenafil 100 mg as needed.      Objective   Vital Signs:   /80 (BP Location: Right arm, Patient Position: Sitting)   Pulse 67   Ht 165.1 cm (65\")   Wt 65 kg (143 lb 3.2 oz)   BMI 23.83 kg/m²     Physical Exam  Constitutional:       General: He is not in acute distress.     Appearance: Normal appearance. He is normal weight.   HENT:      Head: Normocephalic.   Eyes:      Pupils: Pupils are equal, round, and reactive to light.      Visual Fields: Right eye visual fields normal and left eye visual fields normal.   Neck:      Trachea: Trachea normal.   Cardiovascular:      Rate and Rhythm: Normal rate and regular rhythm.      Heart sounds: Normal heart sounds.   Pulmonary:      Effort: Pulmonary effort is normal.      Breath sounds: Normal breath sounds and air entry.   Musculoskeletal:      Right lower leg: No edema.      Left lower leg: No edema.   Skin:     General: Skin is warm and dry.   Neurological:      Mental Status: He is alert and oriented to person, place, and time.   Psychiatric:         Mood and Affect: Mood and affect normal.         Behavior: Behavior normal.         Thought Content: Thought content normal.        Result Review :   The following data was reviewed by: ANA Fernandez on 12/04/2023:                  Assessment and Plan    Diagnoses and all orders for this visit:    1. Essential hypertension (Primary)  Assessment & Plan:  He reports that he has not been taking his blood " pressure medication for several months.  I will send in a short fill of his lisinopril 20 mg daily and amlodipine 10 mg daily.  He has not had any lab work in almost 2 years.  We have discussed that he needs to take his blood pressure medication and log his readings for 2 weeks and call my nurse with the results.  He is also to obtain his blood work as well.  We have discussed may consider restarting him back on sildenafil based on what his blood pressure readings are and what his lab results are.    Orders:  -     Cancel: CBC (No Diff); Future  -     Cancel: Comprehensive Metabolic Panel; Future  -     Cancel: Lipid Panel; Future  -     CBC (No Diff); Future  -     Comprehensive Metabolic Panel; Future  -     Lipid Panel; Future  -     amLODIPine (NORVASC) 10 MG tablet; Take 1 tablet by mouth Daily.  Dispense: 30 tablet; Refill: 0  -     lisinopril (PRINIVIL,ZESTRIL) 20 MG tablet; Take 1 tablet by mouth Daily.  Dispense: 30 tablet; Refill: 0    2. Prediabetes  Assessment & Plan:  We will check an A1c.    Orders:  -     Cancel: CBC (No Diff); Future  -     Cancel: Comprehensive Metabolic Panel; Future  -     Cancel: Lipid Panel; Future  -     Cancel: Hemoglobin A1c; Future  -     CBC (No Diff); Future  -     Comprehensive Metabolic Panel; Future  -     Hemoglobin A1c; Future  -     Lipid Panel; Future    3. Erectile dysfunction, unspecified erectile dysfunction type    4. Prostate cancer screening  -     Cancel: PSA SCREENING; Future  -     PSA SCREENING; Future    I have given him a paper lab slip for him to complete labs at [facility].      Follow Up   Return in about 6 months (around 6/4/2024).  Patient was given instructions and counseling regarding his condition or for health maintenance advice. Please see specific information pulled into the AVS if appropriate.

## 2023-12-06 DIAGNOSIS — R73.03 PREDIABETES: ICD-10-CM

## 2023-12-06 DIAGNOSIS — I10 ESSENTIAL HYPERTENSION: ICD-10-CM

## 2023-12-07 ENCOUNTER — CLINICAL SUPPORT (OUTPATIENT)
Dept: FAMILY MEDICINE CLINIC | Age: 73
End: 2023-12-07
Payer: MEDICARE

## 2023-12-07 VITALS — SYSTOLIC BLOOD PRESSURE: 135 MMHG | DIASTOLIC BLOOD PRESSURE: 71 MMHG | OXYGEN SATURATION: 97 % | HEART RATE: 76 BPM

## 2023-12-07 DIAGNOSIS — I10 ESSENTIAL HYPERTENSION: Primary | ICD-10-CM

## 2023-12-11 DIAGNOSIS — I10 ESSENTIAL HYPERTENSION: ICD-10-CM

## 2023-12-11 DIAGNOSIS — N52.9 ERECTILE DYSFUNCTION, UNSPECIFIED ERECTILE DYSFUNCTION TYPE: ICD-10-CM

## 2023-12-11 RX ORDER — LISINOPRIL 20 MG/1
20 TABLET ORAL DAILY
Qty: 90 TABLET | OUTPATIENT
Start: 2023-12-11

## 2023-12-11 RX ORDER — AMLODIPINE BESYLATE 10 MG/1
10 TABLET ORAL DAILY
Qty: 90 TABLET | OUTPATIENT
Start: 2023-12-11

## 2023-12-11 RX ORDER — LISINOPRIL 20 MG/1
20 TABLET ORAL DAILY
Qty: 90 TABLET | Refills: 1 | Status: SHIPPED | OUTPATIENT
Start: 2023-12-11

## 2023-12-11 RX ORDER — AMLODIPINE BESYLATE 10 MG/1
10 TABLET ORAL DAILY
Qty: 90 TABLET | Refills: 1 | Status: SHIPPED | OUTPATIENT
Start: 2023-12-11

## 2023-12-11 RX ORDER — SILDENAFIL 100 MG/1
100 TABLET, FILM COATED ORAL DAILY PRN
Qty: 4 TABLET | Refills: 5 | Status: SHIPPED | OUTPATIENT
Start: 2023-12-11

## 2024-06-05 NOTE — PROGRESS NOTES
"Chief Complaint  Follow-up, Med Refill, and Shortness of Breath (With exertion, chest burns and it's hard to breathe. Has been going on for about 6 months.)    Subjective          Viral Bach presents to [facility]  History of Present Illness  HTN: He is taking lisinopril 20 mg daily and amlodipine 10 mg daily.     Prediabetes: He had a lab slip for an A1c, but had not been completed.    He complains of SOA and burning in his chest with exertional work. He reports that it occurs with mowing and other activities. He denies chest pain. He denies orthopnea. He has occasional dizzy spells.           Objective   Vital Signs:   /71 (BP Location: Right arm, Patient Position: Sitting, Cuff Size: Adult)   Pulse 67   Temp 97.5 °F (36.4 °C) (Oral)   Resp 16   Ht 165.1 cm (65\")   Wt 66.5 kg (146 lb 9.6 oz)   SpO2 95%   BMI 24.40 kg/m²     Physical Exam  Constitutional:       General: He is not in acute distress.     Appearance: Normal appearance. He is normal weight.   HENT:      Head: Normocephalic.   Eyes:      Pupils: Pupils are equal, round, and reactive to light.      Visual Fields: Right eye visual fields normal and left eye visual fields normal.   Neck:      Trachea: Trachea normal.   Cardiovascular:      Rate and Rhythm: Normal rate and regular rhythm.      Heart sounds: Normal heart sounds.   Pulmonary:      Effort: Pulmonary effort is normal.      Breath sounds: Normal breath sounds and air entry.   Musculoskeletal:      Right lower leg: No edema.      Left lower leg: No edema.   Skin:     General: Skin is warm and dry.   Neurological:      Mental Status: He is alert and oriented to person, place, and time.   Psychiatric:         Mood and Affect: Mood and affect normal.         Behavior: Behavior normal.         Thought Content: Thought content normal.        Result Review :   The following data was reviewed by: ANA Fernandez on 06/04/2024:  CBC with automated " diff (12/05/2023 10:40)  COMPREHENSIVE METABOLIC PANEL (12/05/2023 10:40)  LIPID PANEL (12/05/2023 10:40)  PSA DIAGNOSTIC (12/05/2023 10:40)         Assessment and Plan    Diagnoses and all orders for this visit:    1. Essential hypertension (Primary)  Assessment & Plan:  Hypertension is borderline  Dietary sodium restriction.  Regular aerobic exercise.  Stop smoking.  Blood pressure will be reassessedin 6 months.    He is taking lisinopril 20 mg daily and amlodipine 10 mg daily.    Orders:  -     CBC (No Diff); Future  -     Comprehensive Metabolic Panel; Future  -     Lipid Panel; Future    2. Prediabetes  Assessment & Plan:  Will check an A1c.    Orders:  -     CBC (No Diff); Future  -     Comprehensive Metabolic Panel; Future  -     Hemoglobin A1c; Future  -     Lipid Panel; Future    3. Exertional shortness of breath  -     ECG 12 Lead  -     Ambulatory Referral to Cardiology    4. Erectile dysfunction, unspecified erectile dysfunction type  Assessment & Plan:  Due to his SOA with exertion and dizziness, I would like to refer him to cardiology before filling his sildenafil. EKG performed in office today.      5. Screening for lung cancer  -      CT Chest Low Dose Cancer Screening WO; Future    6. Personal history of nicotine dependence  -      CT Chest Low Dose Cancer Screening WO; Future    7. Dizziness  -     Ambulatory Referral to Cardiology            Follow Up   Return in about 6 months (around 12/10/2024) for Medicare Wellness.  Patient was given instructions and counseling regarding his condition or for health maintenance advice. Please see specific information pulled into the AVS if appropriate.

## 2024-06-10 ENCOUNTER — OFFICE VISIT (OUTPATIENT)
Dept: FAMILY MEDICINE CLINIC | Age: 74
End: 2024-06-10
Payer: MEDICARE

## 2024-06-10 VITALS
SYSTOLIC BLOOD PRESSURE: 145 MMHG | BODY MASS INDEX: 24.43 KG/M2 | DIASTOLIC BLOOD PRESSURE: 71 MMHG | RESPIRATION RATE: 16 BRPM | HEART RATE: 67 BPM | OXYGEN SATURATION: 95 % | TEMPERATURE: 97.5 F | WEIGHT: 146.6 LBS | HEIGHT: 65 IN

## 2024-06-10 DIAGNOSIS — N52.9 ERECTILE DYSFUNCTION, UNSPECIFIED ERECTILE DYSFUNCTION TYPE: ICD-10-CM

## 2024-06-10 DIAGNOSIS — I10 ESSENTIAL HYPERTENSION: Primary | ICD-10-CM

## 2024-06-10 DIAGNOSIS — R42 DIZZINESS: ICD-10-CM

## 2024-06-10 DIAGNOSIS — Z12.2 SCREENING FOR LUNG CANCER: ICD-10-CM

## 2024-06-10 DIAGNOSIS — R06.02 EXERTIONAL SHORTNESS OF BREATH: ICD-10-CM

## 2024-06-10 DIAGNOSIS — R73.03 PREDIABETES: ICD-10-CM

## 2024-06-10 DIAGNOSIS — Z87.891 PERSONAL HISTORY OF NICOTINE DEPENDENCE: ICD-10-CM

## 2024-06-10 PROCEDURE — 3077F SYST BP >= 140 MM HG: CPT | Performed by: NURSE PRACTITIONER

## 2024-06-10 PROCEDURE — 99214 OFFICE O/P EST MOD 30 MIN: CPT | Performed by: NURSE PRACTITIONER

## 2024-06-10 PROCEDURE — 93000 ELECTROCARDIOGRAM COMPLETE: CPT | Performed by: NURSE PRACTITIONER

## 2024-06-10 PROCEDURE — 1160F RVW MEDS BY RX/DR IN RCRD: CPT | Performed by: NURSE PRACTITIONER

## 2024-06-10 PROCEDURE — 1159F MED LIST DOCD IN RCRD: CPT | Performed by: NURSE PRACTITIONER

## 2024-06-10 PROCEDURE — 3078F DIAST BP <80 MM HG: CPT | Performed by: NURSE PRACTITIONER

## 2024-06-10 NOTE — ASSESSMENT & PLAN NOTE
Due to his SOA with exertion and dizziness, I would like to refer him to cardiology before filling his sildenafil. EKG performed in office today.

## 2024-06-10 NOTE — ASSESSMENT & PLAN NOTE
Hypertension is borderline  Dietary sodium restriction.  Regular aerobic exercise.  Stop smoking.  Blood pressure will be reassessedin 6 months.    He is taking lisinopril 20 mg daily and amlodipine 10 mg daily.

## 2024-06-17 ENCOUNTER — HOSPITAL ENCOUNTER (OUTPATIENT)
Dept: CT IMAGING | Facility: HOSPITAL | Age: 74
Discharge: HOME | End: 2024-06-17
Admitting: NURSE PRACTITIONER
Payer: MEDICARE

## 2024-06-17 DIAGNOSIS — Z87.891 PERSONAL HISTORY OF NICOTINE DEPENDENCE: ICD-10-CM

## 2024-06-17 DIAGNOSIS — Z12.2 SCREENING FOR LUNG CANCER: ICD-10-CM

## 2024-06-17 PROCEDURE — 71271 CT THORAX LUNG CANCER SCR C-: CPT

## 2024-06-19 ENCOUNTER — LAB (OUTPATIENT)
Dept: LAB | Facility: HOSPITAL | Age: 74
End: 2024-06-19
Payer: MEDICARE

## 2024-06-19 DIAGNOSIS — R73.03 PREDIABETES: ICD-10-CM

## 2024-06-19 DIAGNOSIS — I10 ESSENTIAL HYPERTENSION: ICD-10-CM

## 2024-06-19 LAB
ALBUMIN SERPL-MCNC: 4.2 G/DL (ref 3.5–5.2)
ALBUMIN/GLOB SERPL: 1.2 G/DL
ALP SERPL-CCNC: 105 U/L (ref 39–117)
ALT SERPL W P-5'-P-CCNC: 12 U/L (ref 1–41)
ANION GAP SERPL CALCULATED.3IONS-SCNC: 10.9 MMOL/L (ref 5–15)
AST SERPL-CCNC: 14 U/L (ref 1–40)
BILIRUB SERPL-MCNC: 0.4 MG/DL (ref 0–1.2)
BUN SERPL-MCNC: 15 MG/DL (ref 8–23)
BUN/CREAT SERPL: 10.6 (ref 7–25)
CALCIUM SPEC-SCNC: 9.7 MG/DL (ref 8.6–10.5)
CHLORIDE SERPL-SCNC: 103 MMOL/L (ref 98–107)
CHOLEST SERPL-MCNC: 174 MG/DL (ref 0–200)
CO2 SERPL-SCNC: 23.1 MMOL/L (ref 22–29)
CREAT SERPL-MCNC: 1.41 MG/DL (ref 0.76–1.27)
DEPRECATED RDW RBC AUTO: 44.9 FL (ref 37–54)
EGFRCR SERPLBLD CKD-EPI 2021: 52.3 ML/MIN/1.73
ERYTHROCYTE [DISTWIDTH] IN BLOOD BY AUTOMATED COUNT: 12.6 % (ref 12.3–15.4)
GLOBULIN UR ELPH-MCNC: 3.4 GM/DL
GLUCOSE SERPL-MCNC: 120 MG/DL (ref 65–99)
HBA1C MFR BLD: 6.3 % (ref 4.8–5.6)
HCT VFR BLD AUTO: 48.6 % (ref 37.5–51)
HDLC SERPL-MCNC: 52 MG/DL (ref 40–60)
HGB BLD-MCNC: 15.9 G/DL (ref 13–17.7)
LDLC SERPL CALC-MCNC: 100 MG/DL (ref 0–100)
LDLC/HDLC SERPL: 1.87 {RATIO}
MCH RBC QN AUTO: 30.8 PG (ref 26.6–33)
MCHC RBC AUTO-ENTMCNC: 32.7 G/DL (ref 31.5–35.7)
MCV RBC AUTO: 94 FL (ref 79–97)
PLATELET # BLD AUTO: 309 10*3/MM3 (ref 140–450)
PMV BLD AUTO: 9.3 FL (ref 6–12)
POTASSIUM SERPL-SCNC: 4.9 MMOL/L (ref 3.5–5.2)
PROT SERPL-MCNC: 7.6 G/DL (ref 6–8.5)
RBC # BLD AUTO: 5.17 10*6/MM3 (ref 4.14–5.8)
SODIUM SERPL-SCNC: 137 MMOL/L (ref 136–145)
TRIGL SERPL-MCNC: 125 MG/DL (ref 0–150)
VLDLC SERPL-MCNC: 22 MG/DL (ref 5–40)
WBC NRBC COR # BLD AUTO: 11.29 10*3/MM3 (ref 3.4–10.8)

## 2024-06-19 PROCEDURE — 36415 COLL VENOUS BLD VENIPUNCTURE: CPT

## 2024-06-19 PROCEDURE — 80061 LIPID PANEL: CPT

## 2024-06-19 PROCEDURE — 85027 COMPLETE CBC AUTOMATED: CPT

## 2024-06-19 PROCEDURE — 80053 COMPREHEN METABOLIC PANEL: CPT

## 2024-06-19 PROCEDURE — 83036 HEMOGLOBIN GLYCOSYLATED A1C: CPT

## 2024-06-21 DIAGNOSIS — R94.4 DECREASED GFR: Primary | ICD-10-CM

## 2024-06-21 RX ORDER — ATORVASTATIN CALCIUM 40 MG/1
40 TABLET, FILM COATED ORAL DAILY
Qty: 90 TABLET | Refills: 1 | Status: SHIPPED | OUTPATIENT
Start: 2024-06-21

## 2024-06-24 ENCOUNTER — TELEPHONE (OUTPATIENT)
Dept: FAMILY MEDICINE CLINIC | Age: 74
End: 2024-06-24
Payer: MEDICARE

## 2024-06-24 DIAGNOSIS — N52.9 ERECTILE DYSFUNCTION, UNSPECIFIED ERECTILE DYSFUNCTION TYPE: ICD-10-CM

## 2024-06-24 RX ORDER — SILDENAFIL 100 MG/1
100 TABLET, FILM COATED ORAL DAILY PRN
Qty: 4 TABLET | Refills: 5 | OUTPATIENT
Start: 2024-06-24

## 2024-06-24 NOTE — TELEPHONE ENCOUNTER
Caller: Viral Bach    Relationship: Self    Best call back number:     [phone]        Requested Prescriptions:   Requested Prescriptions     Pending Prescriptions Disp Refills    sildenafil (VIAGRA) 100 MG tablet 4 tablet 5     Sig: Take 1 tablet by mouth Daily As Needed for Erectile Dysfunction.        Pharmacy where request should be sent: [id number] - [facility] [place] - [address] [place] [facility] [address] - [phone] [facility] [phone] FX     Last office visit with prescribing clinician: 6/10/2024   Last telemedicine visit with prescribing clinician: Visit date not found   Next office visit with prescribing clinician: 12/10/2024     Additional details provided by patient:     Does the patient have less than a 3 day supply:  [x] Yes  [] No    Would you like a call back once the refill request has been completed: [] Yes [x] No    If the office needs to give you a call back, can they leave a voicemail: [] Yes [x] No    Loretta Goldman   06/24/24 13:25 EDT

## 2024-06-24 NOTE — TELEPHONE ENCOUNTER
Please schedule him an appt to discuss, as he did not mention this at his last office visit. Thanks.

## 2024-06-25 ENCOUNTER — OFFICE VISIT (OUTPATIENT)
Dept: FAMILY MEDICINE CLINIC | Age: 74
End: 2024-06-25
Payer: MEDICARE

## 2024-06-25 VITALS
DIASTOLIC BLOOD PRESSURE: 70 MMHG | HEART RATE: 71 BPM | HEIGHT: 65 IN | SYSTOLIC BLOOD PRESSURE: 149 MMHG | BODY MASS INDEX: 24.56 KG/M2 | WEIGHT: 147.4 LBS

## 2024-06-25 DIAGNOSIS — R35.0 URINARY FREQUENCY: ICD-10-CM

## 2024-06-25 DIAGNOSIS — R35.1 BENIGN PROSTATIC HYPERPLASIA WITH NOCTURIA: Primary | ICD-10-CM

## 2024-06-25 DIAGNOSIS — N40.1 BENIGN PROSTATIC HYPERPLASIA WITH NOCTURIA: Primary | ICD-10-CM

## 2024-06-25 LAB
BILIRUB BLD-MCNC: NEGATIVE MG/DL
CLARITY, POC: CLEAR
COLOR UR: YELLOW
EXPIRATION DATE: ABNORMAL
GLUCOSE UR STRIP-MCNC: NEGATIVE MG/DL
KETONES UR QL: NEGATIVE
LEUKOCYTE EST, POC: NEGATIVE
Lab: ABNORMAL
NITRITE UR-MCNC: NEGATIVE MG/ML
PH UR: 5.5 [PH] (ref 5–8)
PROT UR STRIP-MCNC: ABNORMAL MG/DL
RBC # UR STRIP: ABNORMAL /UL
SP GR UR: 1.02 (ref 1–1.03)
UROBILINOGEN UR QL: ABNORMAL

## 2024-06-25 PROCEDURE — 3078F DIAST BP <80 MM HG: CPT | Performed by: NURSE PRACTITIONER

## 2024-06-25 PROCEDURE — 3077F SYST BP >= 140 MM HG: CPT | Performed by: NURSE PRACTITIONER

## 2024-06-25 PROCEDURE — 1160F RVW MEDS BY RX/DR IN RCRD: CPT | Performed by: NURSE PRACTITIONER

## 2024-06-25 PROCEDURE — 1159F MED LIST DOCD IN RCRD: CPT | Performed by: NURSE PRACTITIONER

## 2024-06-25 PROCEDURE — 99213 OFFICE O/P EST LOW 20 MIN: CPT | Performed by: NURSE PRACTITIONER

## 2024-06-25 PROCEDURE — 81003 URINALYSIS AUTO W/O SCOPE: CPT | Performed by: NURSE PRACTITIONER

## 2024-06-25 RX ORDER — TAMSULOSIN HYDROCHLORIDE 0.4 MG/1
1 CAPSULE ORAL DAILY
Qty: 90 CAPSULE | Refills: 1 | Status: SHIPPED | OUTPATIENT
Start: 2024-06-25

## 2024-06-25 NOTE — ASSESSMENT & PLAN NOTE
Will give a trial of tamsulosin. We have discussed when to take the  medication and potential side effects.

## 2024-06-25 NOTE — PROGRESS NOTES
"Chief Complaint  Urinary Frequency (Pt states that he feels like he constantly has to go to the bathroom at night time. Ongoing for years./)    Subjective          Viral Bach presents to [facility]  History of Present Illness  He reports having nocturia for several years. He reports that it's occasional. Generally, he is getting up 3-4 times at night. He reports not voiding too often during the day. He reports one complete stream. He does feel that his stream is weaker. He does endorse urinary urgency and occasional hesistancy. He denies dysuria, incomplete emptying, and hematuria.       Objective   Vital Signs:   /70 (BP Location: Right arm, Patient Position: Sitting)   Pulse 71   Ht 165.1 cm (65\")   Wt 66.9 kg (147 lb 6.4 oz)   BMI 24.53 kg/m²     Physical Exam  Constitutional:       General: He is not in acute distress.     Appearance: Normal appearance. He is normal weight.   HENT:      Head: Normocephalic.   Eyes:      Pupils: Pupils are equal, round, and reactive to light.      Visual Fields: Right eye visual fields normal and left eye visual fields normal.   Neck:      Trachea: Trachea normal.   Cardiovascular:      Rate and Rhythm: Normal rate and regular rhythm.      Heart sounds: Normal heart sounds.   Pulmonary:      Effort: Pulmonary effort is normal.      Breath sounds: Normal breath sounds and air entry.   Musculoskeletal:      Right lower leg: No edema.      Left lower leg: No edema.   Skin:     General: Skin is warm and dry.   Neurological:      Mental Status: He is alert and oriented to person, place, and time.   Psychiatric:         Mood and Affect: Mood and affect normal.         Behavior: Behavior normal.         Thought Content: Thought content normal.        Result Review :   The following data was reviewed by: ANA Fernandez on 06/25/2024:                  Assessment and Plan    Diagnoses and all orders for this visit:    1. Benign " prostatic hyperplasia with nocturia (Primary)  Assessment & Plan:  Will give a trial of tamsulosin. We have discussed when to take the  medication and potential side effects.    Orders:  -     tamsulosin (FLOMAX) 0.4 MG capsule 24 hr capsule; Take 1 capsule by mouth Daily.  Dispense: 90 capsule; Refill: 1    2. Urinary frequency  -     POCT urinalysis dipstick, automated          Follow Up   No follow-ups on file.  Patient was given instructions and counseling regarding his condition or for health maintenance advice. Please see specific information pulled into the AVS if appropriate.

## 2024-08-21 ENCOUNTER — TELEPHONE (OUTPATIENT)
Dept: FAMILY MEDICINE CLINIC | Age: 74
End: 2024-08-21
Payer: MEDICARE

## 2024-08-26 ENCOUNTER — LAB (OUTPATIENT)
Dept: LAB | Facility: HOSPITAL | Age: 74
End: 2024-08-26
Payer: MEDICARE

## 2024-08-26 DIAGNOSIS — R73.03 PREDIABETES: ICD-10-CM

## 2024-08-26 DIAGNOSIS — R94.4 DECREASED GFR: ICD-10-CM

## 2024-08-26 DIAGNOSIS — Z12.5 PROSTATE CANCER SCREENING: ICD-10-CM

## 2024-08-26 DIAGNOSIS — I10 ESSENTIAL HYPERTENSION: ICD-10-CM

## 2024-08-26 LAB
DEPRECATED RDW RBC AUTO: 43.2 FL (ref 37–54)
ERYTHROCYTE [DISTWIDTH] IN BLOOD BY AUTOMATED COUNT: 12.4 % (ref 12.3–15.4)
HCT VFR BLD AUTO: 48.2 % (ref 37.5–51)
HGB BLD-MCNC: 15.8 G/DL (ref 13–17.7)
MCH RBC QN AUTO: 30.6 PG (ref 26.6–33)
MCHC RBC AUTO-ENTMCNC: 32.8 G/DL (ref 31.5–35.7)
MCV RBC AUTO: 93.2 FL (ref 79–97)
PLATELET # BLD AUTO: 252 10*3/MM3 (ref 140–450)
PMV BLD AUTO: 9.5 FL (ref 6–12)
RBC # BLD AUTO: 5.17 10*6/MM3 (ref 4.14–5.8)
WBC NRBC COR # BLD AUTO: 9.02 10*3/MM3 (ref 3.4–10.8)

## 2024-08-26 PROCEDURE — 36415 COLL VENOUS BLD VENIPUNCTURE: CPT

## 2024-08-26 PROCEDURE — 85027 COMPLETE CBC AUTOMATED: CPT

## 2024-08-26 PROCEDURE — 83036 HEMOGLOBIN GLYCOSYLATED A1C: CPT

## 2024-08-26 PROCEDURE — G0103 PSA SCREENING: HCPCS

## 2024-08-26 PROCEDURE — 80048 BASIC METABOLIC PNL TOTAL CA: CPT

## 2024-08-27 LAB
ANION GAP SERPL CALCULATED.3IONS-SCNC: 7.4 MMOL/L (ref 5–15)
BUN SERPL-MCNC: 13 MG/DL (ref 8–23)
BUN/CREAT SERPL: 9.4 (ref 7–25)
CALCIUM SPEC-SCNC: 9.9 MG/DL (ref 8.6–10.5)
CHLORIDE SERPL-SCNC: 101 MMOL/L (ref 98–107)
CO2 SERPL-SCNC: 28.6 MMOL/L (ref 22–29)
CREAT SERPL-MCNC: 1.39 MG/DL (ref 0.76–1.27)
EGFRCR SERPLBLD CKD-EPI 2021: 53.2 ML/MIN/1.73
GLUCOSE SERPL-MCNC: 121 MG/DL (ref 65–99)
HBA1C MFR BLD: 6.3 % (ref 4.8–5.6)
POTASSIUM SERPL-SCNC: 4.5 MMOL/L (ref 3.5–5.2)
PSA SERPL-MCNC: 2.62 NG/ML (ref 0–4)
SODIUM SERPL-SCNC: 137 MMOL/L (ref 136–145)

## 2024-09-04 ENCOUNTER — OFFICE VISIT (OUTPATIENT)
Dept: CARDIOLOGY | Facility: CLINIC | Age: 74
End: 2024-09-04
Payer: MEDICARE

## 2024-09-04 VITALS
HEART RATE: 69 BPM | BODY MASS INDEX: 22.82 KG/M2 | HEIGHT: 65 IN | DIASTOLIC BLOOD PRESSURE: 68 MMHG | SYSTOLIC BLOOD PRESSURE: 138 MMHG | WEIGHT: 137 LBS

## 2024-09-04 DIAGNOSIS — I10 ESSENTIAL HYPERTENSION: ICD-10-CM

## 2024-09-04 DIAGNOSIS — R06.02 EXERTIONAL SHORTNESS OF BREATH: Primary | ICD-10-CM

## 2024-09-04 DIAGNOSIS — E78.2 MIXED HYPERLIPIDEMIA: ICD-10-CM

## 2024-09-04 PROCEDURE — 3078F DIAST BP <80 MM HG: CPT | Performed by: INTERNAL MEDICINE

## 2024-09-04 PROCEDURE — 99204 OFFICE O/P NEW MOD 45 MIN: CPT | Performed by: INTERNAL MEDICINE

## 2024-09-04 PROCEDURE — 3075F SYST BP GE 130 - 139MM HG: CPT | Performed by: INTERNAL MEDICINE

## 2024-09-04 PROCEDURE — 1160F RVW MEDS BY RX/DR IN RCRD: CPT | Performed by: INTERNAL MEDICINE

## 2024-09-04 PROCEDURE — 1159F MED LIST DOCD IN RCRD: CPT | Performed by: INTERNAL MEDICINE

## 2024-09-04 RX ORDER — ASPIRIN 81 MG/1
81 TABLET ORAL DAILY
Start: 2024-09-04

## 2024-09-04 NOTE — ASSESSMENT & PLAN NOTE
Severe shortness of breath with moderate exertion for the past 6 months.  He feels minimal chest tightness.  Symptoms currently improved.  He has multiple risk factors for coronary disease including hypertension, hyperlipidemia, prediabetes and tobacco use.  EKG is abnormal showing nonspecific T wave changes.  Symptoms could be anginal equal and.  He will need further evaluation.  Will proceed with an echocardiogram to assess LV function and valvular function.  SPECT stress test will be performed to rule out ischemia.  Start taking aspirin 81 mg daily.  Counseled regarding tobacco cessation.    If cardiac workup is unremarkable, he may need a pulmonology evaluation.

## 2024-09-04 NOTE — PROGRESS NOTES
CARDIOLOGY INITIAL CONSULT       Chief Complaint  Establish Care, Shortness of Breath, and Dizziness    Subjective            Viral Bach presents to [facility]  History of Present Illness    This is a 74-year-old male with hypertension, hyperlipidemia, BPH, chronic kidney disease, tobacco use.  He was referred for cardiac evaluation because of exertional shortness of breath.  Per patient, symptoms are going on for at least 6 months.  He gets short of breath and tired with moderate exertion.  Occasionally feels a chest tightness.  He also has cough.  No palpitations.  He had dizziness in the beginning but currently subsided.  EKG was performed PCP office with me workup which was abnormal.  He was referred for cardiac evaluation.      Past History:    Medical History:  Past Medical History:   Diagnosis Date    Calculus of gallbladder and bile duct without cholecystitis with obstruction     Essential (primary) hypertension     Helicobacter pylori (H. pylori) as the cause of diseases classified elsewhere     Lumbago with sciatica, left side     Obstruction of bile duct     Pain in left hip        Surgical History: has no past surgical history on file.     Family History: family history includes Coronary artery disease in an other family member; Diabetes type II in an other family member; Heart attack in an other family member; Hypertension in an other family member; Peripheral vascular disease in an other family member.     Social History: reports that he has been smoking cigarettes. He started smoking about 53 years ago. He has a 53.7 pack-year smoking history. He has never used smokeless tobacco. He reports current alcohol use. He reports that he does not use drugs.    Allergies: Patient has no known allergies.    Current Outpatient Medications on File Prior to Visit   Medication Sig    amLODIPine (NORVASC) 10 MG tablet Take 1 tablet by mouth Daily.    atorvastatin (Lipitor) 40 MG  "tablet Take 1 tablet by mouth Daily.    lisinopril (PRINIVIL,ZESTRIL) 20 MG tablet Take 1 tablet by mouth Daily.    sildenafil (VIAGRA) 100 MG tablet Take 1 tablet by mouth Daily As Needed for Erectile Dysfunction.    tamsulosin (FLOMAX) 0.4 MG capsule 24 hr capsule Take 1 capsule by mouth Daily.     No current facility-administered medications on file prior to visit.          Review of Systems   Constitutional:  Negative for fatigue, unexpected weight gain and unexpected weight loss.   Eyes:  Negative for double vision.   Respiratory:  Positive for cough and shortness of breath. Negative for wheezing.    Cardiovascular:  Negative for chest pain, palpitations and leg swelling.   Gastrointestinal:  Negative for abdominal pain, nausea and vomiting.   Endocrine: Negative for cold intolerance, heat intolerance, polydipsia and polyuria.   Musculoskeletal:  Negative for arthralgias and back pain.   Skin:  Negative for color change.   Neurological:  Negative for dizziness, syncope, weakness and headache.   Hematological:  Does not bruise/bleed easily.        Objective     /68   Pulse 69   Ht 165.1 cm (65\")   Wt 62.1 kg (137 lb)   BMI 22.80 kg/m²       Physical Exam  Constitutional:       General: He is awake. He is not in acute distress.     Appearance: Normal appearance.   Eyes:      Extraocular Movements: Extraocular movements intact.      Pupils: Pupils are equal, round, and reactive to light.   Neck:      Thyroid: No thyromegaly.      Vascular: No carotid bruit or JVD.   Cardiovascular:      Rate and Rhythm: Normal rate and regular rhythm.      Chest Wall: PMI is not displaced.      Heart sounds: Normal heart sounds, S1 normal and S2 normal. No murmur heard.     No friction rub. No gallop. No S3 or S4 sounds.   Pulmonary:      Effort: Pulmonary effort is normal. No respiratory distress.      Breath sounds: Normal breath sounds. No wheezing, rhonchi or rales.   Abdominal:      General: Bowel sounds are normal. "      Palpations: Abdomen is soft.      Tenderness: There is no abdominal tenderness.   Musculoskeletal:      Cervical back: Neck supple.      Right lower leg: No edema.      Left lower leg: No edema.   Skin:     Nails: There is no clubbing.   Neurological:      General: No focal deficit present.      Mental Status: He is alert and oriented to person, place, and time.           Result Review :     The following data was reviewed by: Rip Lizarraga MD on 09/04/2024:    CMP          6/19/2024    11:26 8/26/2024    10:26   CMP   Glucose 120  121    BUN 15  13    Creatinine 1.41  1.39    EGFR 52.3  53.2    Sodium 137  137    Potassium 4.9  4.5    Chloride 103  101    Calcium 9.7  9.9    Total Protein 7.6     Albumin 4.2     Globulin 3.4     Total Bilirubin 0.4     Alkaline Phosphatase 105     AST (SGOT) 14     ALT (SGPT) 12     Albumin/Globulin Ratio 1.2     BUN/Creatinine Ratio 10.6  9.4    Anion Gap 10.9  7.4      CBC          6/19/2024    11:26 8/26/2024    10:26   CBC   WBC 11.29  9.02    RBC 5.17  5.17    Hemoglobin 15.9  15.8    Hematocrit 48.6  48.2    MCV 94.0  93.2    MCH 30.8  30.6    MCHC 32.7  32.8    RDW 12.6  12.4    Platelets 309  252        Lipid Panel          6/19/2024    11:26   Lipid Panel   Total Cholesterol 174    Triglycerides 125    HDL Cholesterol 52    VLDL Cholesterol 22    LDL Cholesterol  100    LDL/HDL Ratio 1.87         Data reviewed: Cardiology studies        EKG done at PCP office on 6/10/2024 showed sinus rhythm, single ventricular premature complex, biphasic T waves in lead V2-V3, abnormal EKG             Assessment and Plan        Diagnoses and all orders for this visit:    1. Exertional shortness of breath (Primary)  Assessment & Plan:  Severe shortness of breath with moderate exertion for the past 6 months.  He feels minimal chest tightness.  Symptoms currently improved.  He has multiple risk factors for coronary disease including hypertension, hyperlipidemia, prediabetes and  tobacco use.  EKG is abnormal showing nonspecific T wave changes.  Symptoms could be anginal equal and.  He will need further evaluation.  Will proceed with an echocardiogram to assess LV function and valvular function.  SPECT stress test will be performed to rule out ischemia.  Start taking aspirin 81 mg daily.  Counseled regarding tobacco cessation.    If cardiac workup is unremarkable, he may need a pulmonology evaluation.    Orders:  -     Adult Transthoracic Echo Complete W/ Cont if Necessary Per Protocol; Future  -     Stress Test With Myocardial Perfusion One Day; Future  -     aspirin 81 MG EC tablet; Take 1 tablet by mouth Daily.    2. Essential hypertension  Assessment & Plan:  Blood pressure is reasonably well-controlled.  Continue amlodipine and lisinopril at the current dose.      3. Mixed hyperlipidemia  Assessment & Plan:  Most recent LDL is 100, which is above goal due to presence of prediabetes.  Patient is somewhat noncompliant and takes the medication shortly intermittently.  Counseled regarding medication compliance.  As of now, continue atorvastatin 40 mg daily.            I spent 19 minutes caring for Viral on this date of service. This time includes time spent by me in the following activities:reviewing tests, obtaining and/or reviewing a separately obtained history, performing a medically appropriate examination and/or evaluation , ordering medications, tests, or procedures, and documenting information in the medical record    Follow Up     Return for Will schedule f/u after reviewing test results.    Patient was given instructions and counseling regarding his condition or for health maintenance advice. Please see specific information pulled into the AVS if appropriate.

## 2024-09-04 NOTE — ASSESSMENT & PLAN NOTE
Blood pressure is reasonably well-controlled.  Continue amlodipine and lisinopril at the current dose.

## 2024-09-04 NOTE — ASSESSMENT & PLAN NOTE
Most recent LDL is 100, which is above goal due to presence of prediabetes.  Patient is somewhat noncompliant and takes the medication shortly intermittently.  Counseled regarding medication compliance.  As of now, continue atorvastatin 40 mg daily.

## 2024-09-05 ENCOUNTER — HOSPITAL ENCOUNTER (OUTPATIENT)
Dept: NUCLEAR MEDICINE | Facility: HOSPITAL | Age: 74
Discharge: HOME | End: 2024-09-05
Payer: MEDICARE

## 2024-09-05 ENCOUNTER — HOSPITAL ENCOUNTER (OUTPATIENT)
Dept: CARDIOLOGY | Facility: HOSPITAL | Age: 74
Discharge: HOME | End: 2024-09-05
Admitting: INTERNAL MEDICINE
Payer: MEDICARE

## 2024-09-05 DIAGNOSIS — R06.02 EXERTIONAL SHORTNESS OF BREATH: ICD-10-CM

## 2024-09-05 PROCEDURE — 93017 CV STRESS TEST TRACING ONLY: CPT

## 2024-09-05 PROCEDURE — A9502 TC99M TETROFOSMIN: HCPCS | Performed by: INTERNAL MEDICINE

## 2024-09-05 PROCEDURE — 0 TECHNETIUM TETROFOSMIN KIT: Performed by: INTERNAL MEDICINE

## 2024-09-05 PROCEDURE — 25010000002 REGADENOSON 0.4 MG/5ML SOLUTION: Performed by: INTERNAL MEDICINE

## 2024-09-05 PROCEDURE — 78452 HT MUSCLE IMAGE SPECT MULT: CPT

## 2024-09-05 PROCEDURE — 93306 TTE W/DOPPLER COMPLETE: CPT

## 2024-09-05 RX ORDER — REGADENOSON 0.08 MG/ML
0.4 INJECTION, SOLUTION INTRAVENOUS
Status: COMPLETED | OUTPATIENT
Start: 2024-09-05 | End: 2024-09-05

## 2024-09-05 RX ADMIN — TETROFOSMIN 1 DOSE: 1.38 INJECTION, POWDER, LYOPHILIZED, FOR SOLUTION INTRAVENOUS at 08:43

## 2024-09-05 RX ADMIN — REGADENOSON 0.4 MG: 0.08 INJECTION, SOLUTION INTRAVENOUS at 08:43

## 2024-09-05 RX ADMIN — TETROFOSMIN 1 DOSE: 1.38 INJECTION, POWDER, LYOPHILIZED, FOR SOLUTION INTRAVENOUS at 07:46

## 2024-09-06 LAB
ASCENDING AORTA: 3.2 CM
BH CV ECHO MEAS - ACS: 1.75 CM
BH CV ECHO MEAS - AO MAX PG: 4.8 MMHG
BH CV ECHO MEAS - AO MEAN PG: 2.9 MMHG
BH CV ECHO MEAS - AO ROOT DIAM: 3.3 CM
BH CV ECHO MEAS - AO V2 MAX: 109.4 CM/SEC
BH CV ECHO MEAS - AO V2 VTI: 28 CM
BH CV ECHO MEAS - EDV(MOD-SP2): 92.1 ML
BH CV ECHO MEAS - EDV(MOD-SP4): 69.8 ML
BH CV ECHO MEAS - EF(MOD-SP2): 55.7 %
BH CV ECHO MEAS - EF(MOD-SP4): 46.8 %
BH CV ECHO MEAS - ESV(MOD-SP2): 40.8 ML
BH CV ECHO MEAS - ESV(MOD-SP4): 37.1 ML
BH CV ECHO MEAS - IVS/LVPW: 0.75 CM
BH CV ECHO MEAS - IVSD: 0.9 CM
BH CV ECHO MEAS - LA DIMENSION: 2.7 CM
BH CV ECHO MEAS - LAT PEAK E' VEL: 9 CM/SEC
BH CV ECHO MEAS - LV DIASTOLIC VOL/BSA (35-75): 38.9 CM2
BH CV ECHO MEAS - LV MAX PG: 2.6 MMHG
BH CV ECHO MEAS - LV MEAN PG: 1.2 MMHG
BH CV ECHO MEAS - LV SYSTOLIC VOL/BSA (12-30): 20.7 CM2
BH CV ECHO MEAS - LV V1 MAX: 80 CM/SEC
BH CV ECHO MEAS - LV V1 VTI: 18.1 CM
BH CV ECHO MEAS - LVIDD: 3.7 CM
BH CV ECHO MEAS - LVIDS: 3 CM
BH CV ECHO MEAS - LVOT DIAM: 2 CM
BH CV ECHO MEAS - LVPWD: 1.2 CM
BH CV ECHO MEAS - MED PEAK E' VEL: 5.1 CM/SEC
BH CV ECHO MEAS - MR MAX PG: 28.1 MMHG
BH CV ECHO MEAS - MR MAX VEL: 265.1 CM/SEC
BH CV ECHO MEAS - MV A MAX VEL: 79.7 CM/SEC
BH CV ECHO MEAS - MV E MAX VEL: 63 CM/SEC
BH CV ECHO MEAS - MV E/A: 0.79
BH CV ECHO MEAS - MV MAX PG: 3.1 MMHG
BH CV ECHO MEAS - MV MEAN PG: 1.1 MMHG
BH CV ECHO MEAS - MV V2 VTI: 34.1 CM
BH CV ECHO MEAS - PA ACC TIME: 0.08 SEC
BH CV ECHO MEAS - PA V2 MAX: 70 CM/SEC
BH CV ECHO MEAS - RV MAX PG: 1 MMHG
BH CV ECHO MEAS - RV V1 MAX: 50 CM/SEC
BH CV ECHO MEAS - RV V1 VTI: 12.3 CM
BH CV ECHO MEAS - SV(MOD-SP2): 51.3 ML
BH CV ECHO MEAS - SV(MOD-SP4): 32.7 ML
BH CV ECHO MEAS - SVI(MOD-SP2): 28.6 ML/M2
BH CV ECHO MEAS - SVI(MOD-SP4): 18.2 ML/M2
BH CV ECHO MEAS - TAPSE (>1.6): 2.29 CM
BH CV ECHO MEAS - TR MAX PG: 9.5 MMHG
BH CV ECHO MEAS - TR MAX VEL: 152.1 CM/SEC
BH CV ECHO MEASUREMENTS AVERAGE E/E' RATIO: 8.94
BH CV IMMEDIATE POST TECH DATA BLOOD PRESSURE: NORMAL MMHG
BH CV IMMEDIATE POST TECH DATA HEART RATE: 115 BPM
BH CV IMMEDIATE POST TECH DATA OXYGEN SATS: 98 %
BH CV NINE MINUTE RECOVERY TECH DATA BLOOD PRESSURE: NORMAL MMHG
BH CV NINE MINUTE RECOVERY TECH DATA HEART RATE: 80 BPM
BH CV NINE MINUTE RECOVERY TECH DATA OXYGEN SATURATION: 98 %
BH CV REST NUCLEAR ISOTOPE DOSE: 9.5 MCI
BH CV SIX MINUTE RECOVERY TECH DATA BLOOD PRESSURE: NORMAL
BH CV SIX MINUTE RECOVERY TECH DATA HEART RATE: 79 BPM
BH CV SIX MINUTE RECOVERY TECH DATA OXYGEN SATURATION: 98 %
BH CV STRESS BP STAGE 1: NORMAL
BH CV STRESS BP STAGE 2: NORMAL
BH CV STRESS DURATION MIN STAGE 1: 3
BH CV STRESS DURATION MIN STAGE 2: 3
BH CV STRESS DURATION SEC STAGE 1: 0
BH CV STRESS DURATION SEC STAGE 2: 0
BH CV STRESS GRADE STAGE 1: 10
BH CV STRESS GRADE STAGE 2: 12
BH CV STRESS HR STAGE 1: 92
BH CV STRESS HR STAGE 2: 106
BH CV STRESS METS STAGE 1: 5
BH CV STRESS METS STAGE 2: 7.5
BH CV STRESS NUCLEAR ISOTOPE DOSE: 37.1 MCI
BH CV STRESS O2 STAGE 1: 97
BH CV STRESS O2 STAGE 2: 98
BH CV STRESS PROTOCOL 1: NORMAL
BH CV STRESS PROTOCOL 2 BP STAGE 1: NORMAL
BH CV STRESS PROTOCOL 2 COMMENTS STAGE 1: NORMAL
BH CV STRESS PROTOCOL 2 DOSE REGADENOSON STAGE 1: 0.4
BH CV STRESS PROTOCOL 2 DURATION MIN STAGE 1: 0
BH CV STRESS PROTOCOL 2 DURATION SEC STAGE 1: 10
BH CV STRESS PROTOCOL 2 HR STAGE 1: 91
BH CV STRESS PROTOCOL 2 O2 STAGE 1: 97
BH CV STRESS PROTOCOL 2 STAGE 1: 1
BH CV STRESS PROTOCOL 2: NORMAL
BH CV STRESS PROTOCOL 2: NORMAL
BH CV STRESS RECOVERY BP: NORMAL MMHG
BH CV STRESS RECOVERY HR: 75 BPM
BH CV STRESS RECOVERY O2: 98 %
BH CV STRESS SPEED STAGE 1: 1.7
BH CV STRESS SPEED STAGE 2: 2.5
BH CV STRESS STAGE 1: 1
BH CV STRESS STAGE 2: 2
BH CV THREE MINUTE POST TECH DATA BLOOD PRESSURE: NORMAL MMHG
BH CV THREE MINUTE POST TECH DATA HEART RATE: 82 BPM
BH CV THREE MINUTE POST TECH DATA OXYGEN SATURATION: 98 %
BH CV TWELVE MINUTE RECOVERY TECH DATA BLOOD PRESSURE: NORMAL MMHG
BH CV TWELVE MINUTE RECOVERY TECH DATA HEART RATE: 75 BPM
BH CV TWELVE MINUTE RECOVERY TECH DATA OXYGEN SATURATION: 98 %
BH CV XLRA - TDI S': 16.4 CM/SEC
LV EF 2D ECHO EST: 50 %
LV EF NUC BP: 56 %
MAXIMAL PREDICTED HEART RATE: 146 BPM
PERCENT MAX PREDICTED HR: 78.77 %
STRESS BASELINE BP: NORMAL MMHG
STRESS BASELINE HR: 63 BPM
STRESS O2 SAT REST: 98 %
STRESS PERCENT HR: 93 %
STRESS POST ESTIMATED WORKLOAD: 7.1 METS
STRESS POST EXERCISE DUR MIN: 8 MIN
STRESS POST EXERCISE DUR SEC: 3 SEC
STRESS POST O2 SAT PEAK: 98 %
STRESS POST PEAK BP: NORMAL MMHG
STRESS POST PEAK HR: 115 BPM
STRESS TARGET HR: 124 BPM

## 2024-09-08 NOTE — PROGRESS NOTES
Stress test is abnormal.  There is a small area at the tip of the heart which is not getting good blood supply.  This generally indicates a blockage in one of the branch arteries of the heart.    Echocardiogram showed normal heart function with no major valvular problems.    Because of symptoms and abnormal stress test, we recommend a cardiac catheterization as the next step.  This is an invasive test, we can also treat the blockages with stents if needed during the procedure.  The cath may be scheduled on Tuesday, 9/17/2024 if the patient is agreeable.    He has chronic kidney disease with a creatinine of 1.5.  Hence he will need 2 hours of IV fluid hydration before the procedure, need to come early in the morning to get the hydration.    Will discuss in detail about the test on the day of visit in the morning.      Electronically signed by Rip Lizarraga MD, 09/08/24, 12:08 PM EDT.

## 2024-09-09 ENCOUNTER — TELEPHONE (OUTPATIENT)
Dept: CARDIOLOGY | Facility: CLINIC | Age: 74
End: 2024-09-09
Payer: MEDICARE

## 2024-09-09 ENCOUNTER — PREP FOR SURGERY (OUTPATIENT)
Dept: OTHER | Facility: HOSPITAL | Age: 74
End: 2024-09-09
Payer: MEDICARE

## 2024-09-09 DIAGNOSIS — R93.1 ABNORMAL FINDINGS ON DIAGNOSTIC IMAGING OF HEART AND CORONARY CIRCULATION: ICD-10-CM

## 2024-09-09 DIAGNOSIS — I20.9 ANGINA PECTORIS: Primary | ICD-10-CM

## 2024-09-09 RX ORDER — SODIUM CHLORIDE 0.9 % (FLUSH) 0.9 %
10 SYRINGE (ML) INJECTION AS NEEDED
OUTPATIENT
Start: 2024-09-09

## 2024-09-09 RX ORDER — SODIUM CHLORIDE 9 MG/ML
40 INJECTION, SOLUTION INTRAVENOUS AS NEEDED
OUTPATIENT
Start: 2024-09-09

## 2024-09-09 NOTE — TELEPHONE ENCOUNTER
ROSHAN patient. Went over results and recommendations. Went over what the procedure entails. Patient is agreeable to proceed with [facility] on 9/18/24. Patient understands scheduling will call and give him complete directions on time of arrival.     Please schedule.

## 2024-09-09 NOTE — TELEPHONE ENCOUNTER
----- Message from Rip Lizarraga sent at 9/8/2024 12:08 PM EDT -----  Stress test is abnormal.  There is a small area at the tip of the heart which is not getting good blood supply.  This generally indicates a blockage in one of the branch arteries of the heart.    Echocardiogram showed normal heart function with no major valvular problems.    Because of symptoms and abnormal stress test, we recommend a cardiac catheterization as the next step.  This is an invasive test, we can also treat the blockages with stents if needed during the procedure.  The cath may be scheduled on Tuesday, 9/17/2024 if the patient is agreeable.    He has chronic kidney disease with a creatinine of 1.5.  Hence he will need 2 hours of IV fluid hydration before the procedure, need to come early in the morning to get the hydration.    Will discuss in detail about the test on the day of visit in the morning.      Electronically signed by Rip Lizarraga MD, 09/08/24, 12:08 PM EDT.

## 2024-09-09 NOTE — TELEPHONE ENCOUNTER
Cath orders placed for Tuesday, 9/17/2024.  Patient should come early (before 7 AM if possible) to start preoperative IV fluids.      Electronically signed by Rip Lizarraga MD, 09/09/24, 3:41 PM EDT.

## 2024-09-10 ENCOUNTER — TELEPHONE (OUTPATIENT)
Dept: CARDIOLOGY | Facility: CLINIC | Age: 74
End: 2024-09-10
Payer: MEDICARE

## 2024-09-10 PROBLEM — R93.1 ABNORMAL FINDINGS ON DIAGNOSTIC IMAGING OF HEART AND CORONARY CIRCULATION: Status: ACTIVE | Noted: 2024-09-09

## 2024-09-10 PROBLEM — I20.9 ANGINA PECTORIS: Status: ACTIVE | Noted: 2024-09-09

## 2024-09-10 NOTE — TELEPHONE ENCOUNTER
I spoke to patient and gave an arrival time of 6:30 on 09/17/24 for [facility]. Patient was instructed to have a  for the day of the procedure and to arrive at the [facility] registration area. Patient was educated about stent placement and informed that in the event of stent placement, the patient will be required to stay overnight for observation. Patient was instructed to continue all medications as usual. Patient was instructed to be NPO after midnight with only sips of water as needed. Patient was instructed to have labs and IV fluids completed on the morning of the procedure. Patient is agreeable with no other questions or concerns.

## 2024-09-17 ENCOUNTER — HOSPITAL ENCOUNTER (OUTPATIENT)
Facility: HOSPITAL | Age: 74
Discharge: ANOTHER HOSPITAL | End: 2024-09-17
Attending: INTERNAL MEDICINE | Admitting: INTERNAL MEDICINE
Payer: MEDICARE

## 2024-09-17 VITALS
BODY MASS INDEX: 22.59 KG/M2 | DIASTOLIC BLOOD PRESSURE: 67 MMHG | OXYGEN SATURATION: 96 % | SYSTOLIC BLOOD PRESSURE: 168 MMHG | RESPIRATION RATE: 18 BRPM | HEIGHT: 68 IN | TEMPERATURE: 98.7 F | HEART RATE: 57 BPM | WEIGHT: 149.03 LBS

## 2024-09-17 DIAGNOSIS — I20.9 ANGINA PECTORIS: ICD-10-CM

## 2024-09-17 DIAGNOSIS — R93.1 ABNORMAL FINDINGS ON DIAGNOSTIC IMAGING OF HEART AND CORONARY CIRCULATION: ICD-10-CM

## 2024-09-17 PROBLEM — I51.9 CARDIAC DISEASE: Status: ACTIVE | Noted: 2024-09-17

## 2024-09-17 LAB
ANION GAP SERPL CALCULATED.3IONS-SCNC: 10.5 MMOL/L (ref 5–15)
BASOPHILS # BLD AUTO: 0.16 10*3/MM3 (ref 0–0.2)
BASOPHILS NFR BLD AUTO: 1.2 % (ref 0–1.5)
BUN SERPL-MCNC: 17 MG/DL (ref 8–23)
BUN/CREAT SERPL: 13 (ref 7–25)
CALCIUM SPEC-SCNC: 9.5 MG/DL (ref 8.6–10.5)
CHLORIDE SERPL-SCNC: 103 MMOL/L (ref 98–107)
CO2 SERPL-SCNC: 24.5 MMOL/L (ref 22–29)
CREAT SERPL-MCNC: 1.31 MG/DL (ref 0.76–1.27)
DEPRECATED RDW RBC AUTO: 42.5 FL (ref 37–54)
EGFRCR SERPLBLD CKD-EPI 2021: 57.1 ML/MIN/1.73
EOSINOPHIL # BLD AUTO: 0.31 10*3/MM3 (ref 0–0.4)
EOSINOPHIL NFR BLD AUTO: 2.3 % (ref 0.3–6.2)
ERYTHROCYTE [DISTWIDTH] IN BLOOD BY AUTOMATED COUNT: 12.9 % (ref 12.3–15.4)
GLUCOSE SERPL-MCNC: 122 MG/DL (ref 65–99)
HCT VFR BLD AUTO: 46.1 % (ref 37.5–51)
HGB BLD-MCNC: 15.5 G/DL (ref 13–17.7)
IMM GRANULOCYTES # BLD AUTO: 0.05 10*3/MM3 (ref 0–0.05)
IMM GRANULOCYTES NFR BLD AUTO: 0.4 % (ref 0–0.5)
INR PPP: 1.07 (ref 0.86–1.15)
LYMPHOCYTES # BLD AUTO: 2.99 10*3/MM3 (ref 0.7–3.1)
LYMPHOCYTES NFR BLD AUTO: 22.7 % (ref 19.6–45.3)
MCH RBC QN AUTO: 30.6 PG (ref 26.6–33)
MCHC RBC AUTO-ENTMCNC: 33.6 G/DL (ref 31.5–35.7)
MCV RBC AUTO: 91.1 FL (ref 79–97)
MONOCYTES # BLD AUTO: 1.18 10*3/MM3 (ref 0.1–0.9)
MONOCYTES NFR BLD AUTO: 8.9 % (ref 5–12)
NEUTROPHILS NFR BLD AUTO: 64.5 % (ref 42.7–76)
NEUTROPHILS NFR BLD AUTO: 8.51 10*3/MM3 (ref 1.7–7)
NRBC BLD AUTO-RTO: 0 /100 WBC (ref 0–0.2)
PLATELET # BLD AUTO: 246 10*3/MM3 (ref 140–450)
PMV BLD AUTO: 9.8 FL (ref 6–12)
POTASSIUM SERPL-SCNC: 4.6 MMOL/L (ref 3.5–5.2)
PROTHROMBIN TIME: 14.1 SECONDS (ref 11.8–14.9)
RBC # BLD AUTO: 5.06 10*6/MM3 (ref 4.14–5.8)
SODIUM SERPL-SCNC: 138 MMOL/L (ref 136–145)
WBC NRBC COR # BLD AUTO: 13.2 10*3/MM3 (ref 3.4–10.8)

## 2024-09-17 PROCEDURE — 99152 MOD SED SAME PHYS/QHP 5/>YRS: CPT | Performed by: INTERNAL MEDICINE

## 2024-09-17 PROCEDURE — 93458 L HRT ARTERY/VENTRICLE ANGIO: CPT | Performed by: INTERNAL MEDICINE

## 2024-09-17 PROCEDURE — 25810000003 SODIUM CHLORIDE 0.9 % SOLUTION: Performed by: INTERNAL MEDICINE

## 2024-09-17 PROCEDURE — 85610 PROTHROMBIN TIME: CPT | Performed by: INTERNAL MEDICINE

## 2024-09-17 PROCEDURE — 25510000001 IOPAMIDOL PER 1 ML: Performed by: INTERNAL MEDICINE

## 2024-09-17 PROCEDURE — 80048 BASIC METABOLIC PNL TOTAL CA: CPT | Performed by: INTERNAL MEDICINE

## 2024-09-17 PROCEDURE — C1769 GUIDE WIRE: HCPCS | Performed by: INTERNAL MEDICINE

## 2024-09-17 PROCEDURE — 25010000002 MIDAZOLAM PER 1MG: Performed by: INTERNAL MEDICINE

## 2024-09-17 PROCEDURE — 85025 COMPLETE CBC W/AUTO DIFF WBC: CPT | Performed by: INTERNAL MEDICINE

## 2024-09-17 PROCEDURE — 25010000002 FENTANYL CITRATE (PF) 100 MCG/2ML SOLUTION: Performed by: INTERNAL MEDICINE

## 2024-09-17 PROCEDURE — 25010000002 HEPARIN (PORCINE) PER 1000 UNITS: Performed by: INTERNAL MEDICINE

## 2024-09-17 PROCEDURE — C1894 INTRO/SHEATH, NON-LASER: HCPCS | Performed by: INTERNAL MEDICINE

## 2024-09-17 PROCEDURE — 99153 MOD SED SAME PHYS/QHP EA: CPT | Performed by: INTERNAL MEDICINE

## 2024-09-17 RX ORDER — SODIUM CHLORIDE 9 MG/ML
INJECTION, SOLUTION INTRAVENOUS
Status: COMPLETED | OUTPATIENT
Start: 2024-09-17 | End: 2024-09-17

## 2024-09-17 RX ORDER — SODIUM CHLORIDE 9 MG/ML
125 INJECTION, SOLUTION INTRAVENOUS CONTINUOUS
Status: ACTIVE | OUTPATIENT
Start: 2024-09-17 | End: 2024-09-17

## 2024-09-17 RX ORDER — HEPARIN SODIUM 1000 [USP'U]/ML
INJECTION, SOLUTION INTRAVENOUS; SUBCUTANEOUS
Status: DISCONTINUED | OUTPATIENT
Start: 2024-09-17 | End: 2024-09-17 | Stop reason: HOSPADM

## 2024-09-17 RX ORDER — SODIUM CHLORIDE 9 MG/ML
40 INJECTION, SOLUTION INTRAVENOUS AS NEEDED
Status: DISCONTINUED | OUTPATIENT
Start: 2024-09-17 | End: 2024-09-17 | Stop reason: HOSPADM

## 2024-09-17 RX ORDER — SODIUM CHLORIDE 0.9 % (FLUSH) 0.9 %
10 SYRINGE (ML) INJECTION AS NEEDED
Status: DISCONTINUED | OUTPATIENT
Start: 2024-09-17 | End: 2024-09-17 | Stop reason: HOSPADM

## 2024-09-17 RX ORDER — IOPAMIDOL 755 MG/ML
INJECTION, SOLUTION INTRAVASCULAR
Status: DISCONTINUED | OUTPATIENT
Start: 2024-09-17 | End: 2024-09-17 | Stop reason: HOSPADM

## 2024-09-17 RX ORDER — FENTANYL CITRATE 50 UG/ML
INJECTION, SOLUTION INTRAMUSCULAR; INTRAVENOUS
Status: DISCONTINUED | OUTPATIENT
Start: 2024-09-17 | End: 2024-09-17 | Stop reason: HOSPADM

## 2024-09-17 RX ORDER — MIDAZOLAM HYDROCHLORIDE 2 MG/2ML
INJECTION, SOLUTION INTRAMUSCULAR; INTRAVENOUS
Status: DISCONTINUED | OUTPATIENT
Start: 2024-09-17 | End: 2024-09-17 | Stop reason: HOSPADM

## 2024-09-17 RX ORDER — LIDOCAINE HYDROCHLORIDE 20 MG/ML
INJECTION, SOLUTION INFILTRATION; PERINEURAL
Status: DISCONTINUED | OUTPATIENT
Start: 2024-09-17 | End: 2024-09-17 | Stop reason: HOSPADM

## 2024-09-17 RX ORDER — NITROGLYCERIN 0.4 MG/1
0.4 TABLET SUBLINGUAL
Status: DISCONTINUED | OUTPATIENT
Start: 2024-09-17 | End: 2024-09-17 | Stop reason: HOSPADM

## 2024-09-17 RX ADMIN — SODIUM CHLORIDE 125 ML/HR: 9 INJECTION, SOLUTION INTRAVENOUS at 15:32

## 2024-09-17 RX ADMIN — SODIUM CHLORIDE 500 ML: 9 INJECTION, SOLUTION INTRAVENOUS at 07:54

## 2024-10-04 ENCOUNTER — TELEPHONE (OUTPATIENT)
Dept: FAMILY MEDICINE CLINIC | Age: 74
End: 2024-10-04
Payer: MEDICARE

## 2024-10-04 NOTE — TELEPHONE ENCOUNTER
Caller: Viral Bach    Relationship to patient: Self    Best call back number: [phone]    Patient is needing: PATIENT CALLED IN AND IS REQUESTING A CALL BACK REGARDING HOME HEALTH ASSIGNMENT FOR AFTER HOSPITAL STAY

## 2024-10-04 NOTE — TELEPHONE ENCOUNTER
Spoke to pt, he is not sure who is supposed to be coming to see him for home health, advised pt to contact surgeons office to see if they have that information, also requested discharge summary and hospital records [facility]

## 2024-10-07 PROBLEM — Z95.1 S/P CABG X 2: Status: ACTIVE | Noted: 2024-10-07

## 2024-10-07 NOTE — PROGRESS NOTES
"Chief Complaint  Hospital Follow Up Visit (Open heart surg.)    Subjective          Viral Bach presents to [facility]  History of Present Illness  He underwent a cardiac cath at [facility] September 17, which demonstrated multivessel CAD.  He was transferred to [facility] for CABG evaluation.  Echo showed preserved LV systolic function and trace MR/TR.  Carotid showed 50 to 69% stenosis in LICA.  He wanted to go home, so he was discharged on September 20.  He had a CABG x 2 vessels on September 24 by Dr. Degroot.  He had A-fib with RVR post and tachybradycardia postop requiring prolonged pacer back up.  He was on Amio drip.  He converted back to normal sinus rhythm with occasional bradycardia.  He was transition to p.o. amiodarone.  He was on Eliquis for A-fib.  He failed a 6-minute walk test and the hospital.  He was to continue his furosemide therapy for at least 10 days after discharge. He reports having some chest pain since coming home a little bit. He has an appt next week with his cardiologist.       Objective   Vital Signs:   /70 (BP Location: Right arm, Patient Position: Sitting) Comment: manual  Pulse 68   Temp 97.5 °F (36.4 °C) (Oral)   Ht 172.7 cm (67.99\")   Wt 63.7 kg (140 lb 6.4 oz)   SpO2 97%   BMI 21.35 kg/m²       Vitals:    10/09/24 1339 10/09/24 1406   BP: 165/64 152/70  Comment: manual   BP Location: Right arm Right arm   Patient Position: Sitting Sitting   Pulse: 68    Temp: 97.5 °F (36.4 °C)    TempSrc: Oral    SpO2: 97%    Weight: 63.7 kg (140 lb 6.4 oz)    Height: 172.7 cm (67.99\")         Physical Exam  Constitutional:       General: He is not in acute distress.     Appearance: Normal appearance. He is normal weight.   HENT:      Head: Normocephalic.   Eyes:      Pupils: Pupils are equal, round, and reactive to light.      Visual Fields: Right eye visual fields normal and left eye visual fields normal.   Neck:      Trachea: Trachea " normal.   Cardiovascular:      Rate and Rhythm: Normal rate and regular rhythm.      Heart sounds: Normal heart sounds.   Pulmonary:      Effort: Pulmonary effort is normal.      Breath sounds: Normal breath sounds and air entry.   Musculoskeletal:      Right lower leg: No edema.      Left lower leg: No edema.   Skin:     General: Skin is warm and dry.   Neurological:      Mental Status: He is alert and oriented to person, place, and time.   Psychiatric:         Mood and Affect: Mood and affect normal.         Behavior: Behavior normal.         Thought Content: Thought content normal.        Result Review :   The following data was reviewed by: ANA Fernandez on 10/09/2024:                  Assessment and Plan    Diagnoses and all orders for this visit:    1. Hospital discharge follow-up (Primary)    2. S/P CABG x 2    3. Coronary artery disease involving native heart, unspecified vessel or lesion type, unspecified whether angina present    4. Atrial fibrillation, unspecified type    He reports that his blood pressures are running 110-120/50-70 at home.  I would like for him to record his blood pressure for at least a week and present the readings to his cardiologist.  He has been doing well since discharge.    BMI is within normal parameters. No other follow-up for BMI required.       Follow Up   Return for Next scheduled follow up.  Patient was given instructions and counseling regarding his condition or for health maintenance advice. Please see specific information pulled into the AVS if appropriate.

## 2024-10-09 ENCOUNTER — OFFICE VISIT (OUTPATIENT)
Dept: FAMILY MEDICINE CLINIC | Age: 74
End: 2024-10-09
Payer: MEDICARE

## 2024-10-09 VITALS
SYSTOLIC BLOOD PRESSURE: 152 MMHG | BODY MASS INDEX: 21.28 KG/M2 | OXYGEN SATURATION: 97 % | DIASTOLIC BLOOD PRESSURE: 70 MMHG | HEIGHT: 68 IN | TEMPERATURE: 97.5 F | WEIGHT: 140.4 LBS | HEART RATE: 68 BPM

## 2024-10-09 DIAGNOSIS — Z09 HOSPITAL DISCHARGE FOLLOW-UP: Primary | ICD-10-CM

## 2024-10-09 DIAGNOSIS — Z95.1 S/P CABG X 2: ICD-10-CM

## 2024-10-09 DIAGNOSIS — I48.91 ATRIAL FIBRILLATION, UNSPECIFIED TYPE: ICD-10-CM

## 2024-10-09 DIAGNOSIS — I25.10 CORONARY ARTERY DISEASE INVOLVING NATIVE HEART, UNSPECIFIED VESSEL OR LESION TYPE, UNSPECIFIED WHETHER ANGINA PRESENT: ICD-10-CM

## 2024-10-09 PROCEDURE — 1159F MED LIST DOCD IN RCRD: CPT | Performed by: NURSE PRACTITIONER

## 2024-10-09 PROCEDURE — 3077F SYST BP >= 140 MM HG: CPT | Performed by: NURSE PRACTITIONER

## 2024-10-09 PROCEDURE — 99213 OFFICE O/P EST LOW 20 MIN: CPT | Performed by: NURSE PRACTITIONER

## 2024-10-09 PROCEDURE — 1160F RVW MEDS BY RX/DR IN RCRD: CPT | Performed by: NURSE PRACTITIONER

## 2024-10-09 PROCEDURE — 3078F DIAST BP <80 MM HG: CPT | Performed by: NURSE PRACTITIONER

## 2024-10-09 RX ORDER — AMIODARONE HYDROCHLORIDE 200 MG/1
TABLET ORAL
COMMUNITY
Start: 2024-10-02

## 2024-10-09 RX ORDER — ATORVASTATIN CALCIUM 80 MG/1
80 TABLET, FILM COATED ORAL DAILY
COMMUNITY
Start: 2024-10-02

## 2024-10-09 RX ORDER — HYDROCODONE BITARTRATE AND ACETAMINOPHEN 5; 325 MG/1; MG/1
1 TABLET ORAL EVERY 6 HOURS PRN
COMMUNITY
Start: 2024-10-02 | End: 2024-10-09

## 2024-10-09 RX ORDER — AMOXICILLIN 250 MG
1 CAPSULE ORAL 2 TIMES DAILY PRN
COMMUNITY
Start: 2024-10-02 | End: 2024-10-09

## 2024-10-09 RX ORDER — FUROSEMIDE 20 MG
TABLET ORAL
COMMUNITY
Start: 2024-10-02

## 2024-10-09 NOTE — Clinical Note
Good afternoon. He has an appt next week with you. He recently had CABG x 2 vessels. He was on amlodipine, and he wasn't discharged home with it. I can't find in the record on why it was discontinued. He was started on amiodarone.

## 2024-10-11 RX ORDER — TAMSULOSIN HYDROCHLORIDE 0.4 MG/1
1 CAPSULE ORAL DAILY
Qty: 90 CAPSULE | Refills: 1 | Status: SHIPPED | OUTPATIENT
Start: 2024-10-11

## 2024-10-11 RX ORDER — AMLODIPINE BESYLATE 10 MG/1
10 TABLET ORAL DAILY
Qty: 90 TABLET | Refills: 1 | Status: SHIPPED | OUTPATIENT
Start: 2024-10-11

## 2024-10-11 NOTE — TELEPHONE ENCOUNTER
Spoke with [facility] who stated Flomax and Amlodopine 10mg qd to be added on to current med list, pt has not been taking because he thought he was not supposed to home health states bp today is 150/160s systolic and d/c summary states to continue amlodopine. Last ov states pt was to continue Amlodopine po. Will send in refills

## 2024-10-17 ENCOUNTER — OFFICE VISIT (OUTPATIENT)
Dept: CARDIOLOGY | Facility: CLINIC | Age: 74
End: 2024-10-17
Payer: MEDICARE

## 2024-10-17 VITALS
SYSTOLIC BLOOD PRESSURE: 130 MMHG | BODY MASS INDEX: 21.79 KG/M2 | HEART RATE: 71 BPM | DIASTOLIC BLOOD PRESSURE: 59 MMHG | WEIGHT: 143.8 LBS | HEIGHT: 68 IN

## 2024-10-17 DIAGNOSIS — Z95.1 S/P CABG X 2: ICD-10-CM

## 2024-10-17 DIAGNOSIS — I10 ESSENTIAL HYPERTENSION: ICD-10-CM

## 2024-10-17 DIAGNOSIS — I48.0 PAF (PAROXYSMAL ATRIAL FIBRILLATION): ICD-10-CM

## 2024-10-17 DIAGNOSIS — E78.2 MIXED HYPERLIPIDEMIA: ICD-10-CM

## 2024-10-17 DIAGNOSIS — I25.810 CORONARY ARTERY DISEASE INVOLVING CORONARY BYPASS GRAFT OF NATIVE HEART WITHOUT ANGINA PECTORIS: Primary | ICD-10-CM

## 2024-10-17 RX ORDER — HYDROCODONE BITARTRATE AND ACETAMINOPHEN 5; 325 MG/1; MG/1
1 TABLET ORAL EVERY 6 HOURS PRN
COMMUNITY

## 2024-10-17 RX ORDER — AMOXICILLIN 250 MG
1 CAPSULE ORAL 2 TIMES DAILY
COMMUNITY

## 2024-10-17 NOTE — PROGRESS NOTES
Chief Complaint  Hospital Follow Up Visit (2 Week Follow-Up, patient had Heart Cath procedure on 9/17/2024 with Dr. Lizarraga)    Subjective        History of Present Illness  Viral Bach presents to [facility]   Mr. Bach is a 74-year-old male patient coming today for cardiac follow-up after recent CABG procedure.  An abnormal stress test, and underwent cardiac catheterization with Dr. Lizarraga on 9/17/2024 was found to have multivessel disease.  On 9/24/2024 he underwent CABG x 2 (LIMA to LAD, SVG to RCA) by Dr. Degroot.  He did have some postoperative atrial fibrillation, and was discharged home on anticoagulation with Eliquis.  Initially treated with amiodarone drip for rhythm converted over to oral amiodarone.  He is tolerating this without any bleeding issues.  No complaints of palpitations.  He has some expected soreness of the chest wall and sternotomy site, but they are healing well and well-approximated without any issues.  He failed his 6-minute walk test, and was sent home with oxygen, however has not been requiring oxygen use at home recently.  Feels slightly tired, but states he is improving every day.      Past Medical History:   Diagnosis Date    Angina pectoris     BPH (benign prostatic hyperplasia)     Essential (primary) hypertension     Helicobacter pylori (H. pylori) as the cause of diseases classified elsewhere     Hyperlipidemia     Lumbago with sciatica, left side     Pain in left hip        No Known Allergies     Past Surgical History:   Procedure Laterality Date    CARDIAC CATHETERIZATION N/A 9/17/2024    Procedure: Left Heart Cath with possible angioplasty;  Surgeon: Rip Lizarraga MD;  Location: [facility] INVASIVE LOCATION;  Service: Cardiovascular;  Laterality: N/A;    LAPAROSCOPIC CHOLECYSTECTOMY          Social History  He  reports that he has quit smoking. His smoking use included cigarettes. He started smoking about 53 years ago. He has a 53.8  pack-year smoking history. He has never used smokeless tobacco. He reports current alcohol use. He reports that he does not use drugs.    Family History  His family history includes Coronary artery disease in an other family member; Diabetes type II in an other family member; Heart attack in an other family member; Hypertension in an other family member; Peripheral vascular disease in an other family member.       Current Outpatient Medications on File Prior to Visit   Medication Sig    amiodarone (PACERONE) 200 MG tablet  Tab, Take 2 tabs oral twice daily x5 days, then take 1 tab oral twice daily x 5 days, then take 1 tab oral once daily.    amLODIPine (NORVASC) 10 MG tablet Take 1 tablet by mouth Daily.    apixaban (ELIQUIS) 5 MG tablet tablet Take 1 tablet by mouth Every 12 (Twelve) Hours.    aspirin 81 MG EC tablet Take 1 tablet by mouth Daily.    atorvastatin (LIPITOR) 80 MG tablet Take 1 tablet by mouth Daily.    HYDROcodone-acetaminophen (NORCO) 5-325 MG per tablet Take 1 tablet by mouth Every 6 (Six) Hours As Needed.    sennosides-docusate (EQL Stool Softener/Stimulant) 8.6-50 MG per tablet Take 1 tablet by mouth 2 (Two) Times a Day.    tamsulosin (FLOMAX) 0.4 MG capsule 24 hr capsule Take 1 capsule by mouth Daily.    furosemide (LASIX) 20 MG tablet Take 1 tab oral twice daily x 5 days, then take 1 tab oral once daily x 5 days, then take 1 tab oral once daily as needed for shortness of breath or swelling. (Patient not taking: Reported on 10/17/2024)     No current facility-administered medications on file prior to visit.         Review of Systems   Constitutional:  Positive for fatigue.   Respiratory:  Negative for cough, chest tightness and shortness of breath.    Cardiovascular:  Negative for chest pain, palpitations and leg swelling.   Gastrointestinal:  Negative for nausea and vomiting.   Neurological:  Negative for dizziness and syncope.        Objective   Vitals:    10/17/24 1351   BP: 130/59   BP  "Location: Left arm   Patient Position: Sitting   Cuff Size: Adult   Pulse: 71   Weight: 65.2 kg (143 lb 12.8 oz)   Height: 172.7 cm (67.99\")         Physical Exam  General : Alert, awake, no acute distress  Neck : Supple, no carotid bruit, no jugular venous distention  CVS : Regular rate and rhythm, no murmur, no rubs or gallops  Lungs: Clear to auscultation bilaterally, no crackles or rhonchi  Abdomen: Soft, nontender, bowel sounds active  Extremities: Warm, well-perfused, no pedal edema  Integument: Well-approximated sternotomy site without erythema or drainage    Result Review     The following data was reviewed by Ave Raya, APRN  No results found for: \"PROBNP\"  CMP          6/19/2024    11:26 8/26/2024    10:26 9/17/2024    06:49   CMP   Glucose 120  121  122    BUN 15  13  17    Creatinine 1.41  1.39  1.31    EGFR 52.3  53.2  57.1    Sodium 137  137  138    Potassium 4.9  4.5  4.6    Chloride 103  101  103    Calcium 9.7  9.9  9.5    Total Protein 7.6      Albumin 4.2      Globulin 3.4      Total Bilirubin 0.4      Alkaline Phosphatase 105      AST (SGOT) 14      ALT (SGPT) 12      Albumin/Globulin Ratio 1.2      BUN/Creatinine Ratio 10.6  9.4  13.0    Anion Gap 10.9  7.4  10.5      CBC w/diff          6/19/2024    11:26 8/26/2024    10:26 9/17/2024    06:49   CBC w/Diff   WBC 11.29  9.02  13.20    RBC 5.17  5.17  5.06    Hemoglobin 15.9  15.8  15.5    Hematocrit 48.6  48.2  46.1    MCV 94.0  93.2  91.1    MCH 30.8  30.6  30.6    MCHC 32.7  32.8  33.6    RDW 12.6  12.4  12.9    Platelets 309  252  246    Neutrophil Rel %   64.5    Immature Granulocyte Rel %   0.4    Lymphocyte Rel %   22.7    Monocyte Rel %   8.9    Eosinophil Rel %   2.3    Basophil Rel %   1.2       Lab Results   Component Value Date    TSH 1.420 10/08/2020      No results found for: \"FREET4\"   No results found for: \"DDIMERQUANT\"  Magnesium   Date Value Ref Range Status   10/21/2020 2.03 1.60 - 2.30 mg/dL Final      No results found " "for: \"DIGOXIN\"   No results found for: \"TROPONINT\"        Lipid Panel          6/19/2024    11:26   Lipid Panel   Total Cholesterol 174    Triglycerides 125    HDL Cholesterol 52    VLDL Cholesterol 22    LDL Cholesterol  100    LDL/HDL Ratio 1.87        Results for orders placed during the hospital encounter of 09/05/24    Adult Transthoracic Echo Complete W/ Cont if Necessary Per Protocol    Interpretation Summary    Left ventricular systolic function is low normal. Estimated left ventricular EF = 50%    Left ventricular diastolic function is consistent with (grade I) impaired relaxation.    Aortic valvular sclerosis noted.  There is no hemodynamically significant stenosis or regurgitation.    Estimated right ventricular systolic pressure from tricuspid regurgitation is normal (<35 mmHg).    Results for orders placed during the hospital encounter of 09/05/24    Stress Test With Myocardial Perfusion One Day    Interpretation Summary    Abnormal myocardial SPECT stress test showing a small to moderate-sized apical and distal anterior wall ischemia of moderate intensity (SDS 4)    Left ventricular ejection fraction is normal (Calculated EF = 56%).    Diaphragmatic attenuation artifact is present.    Exercise capacity is mildly impaired.    EKG after regadenoson infusion did not show ischemic changes.  Frequent isolated PVCs noted during stress.    Impressions are consistent with an intermediate risk study.    Cardiac cath    9/17/2024  Conclusions:  Two-vessel coronary artery disease, involving ostial and mid LAD artery, diagonal branch and proximal to mid right coronary artery.  Mildly elevated left ventricular end-diastolic pressure.  Overall LV ejection fraction is 50 to 55%          Assessment and Plan   Diagnoses and all orders for this visit:    1. Coronary artery disease involving coronary bypass graft of native heart without angina pectoris (Primary)  Assessment & Plan:  He is stable and does not have any " symptoms of angina.  He is recovering well from CABG x 2.  Continue daily aspirin, and statin therapy.      2. PAF (paroxysmal atrial fibrillation)  Assessment & Plan:  Postoperatively he had episodes of atrial fibrillation.  He is back in sinus rhythm without any complaints palpitations.  We will plan to continue amiodarone short-term.  Continue anticoagulation with Eliquis.    Orders:  -     Comprehensive Metabolic Panel; Future  -     CBC (No Diff); Future  -     TSH; Future    3. Mixed hyperlipidemia  Assessment & Plan:   Previous LDL is not at goal at 100, however since recent surgery, atorvastatin was increased to maximum dose 80 mg nightly.    Orders:  -     Comprehensive Metabolic Panel; Future  -     Lipid Panel; Future    4. Essential hypertension  Assessment & Plan:  Blood pressure well-controlled.  He was previously on amlodipine and lisinopril, it appears at this time he is only taking amlodipine.  Most recent creatinine is 1.31.      5. S/P CABG x 2  Assessment & Plan:  Continue postsurgical precautions, and follow-ups with CT team.  Once the surgical team has cleared him, we can arrange for him to go to cardiac rehab.             Follow Up   Return in about 3 months (around 1/17/2025) for with Dr. Lizarraga, in [place].    Patient was given instructions and counseling regarding his condition or for health maintenance advice. Please see specific information pulled into the AVS if appropriate.     Signed,  Ave Raya, APRN  10/17/2024     Dictated Utilizing Dragon Dictation: Please note that portions of this note were completed with a voice recognition program.  Part of this note may be an electronic transcription/translation of spoken language to printed text using the Dragon Dictation System.

## 2024-10-18 NOTE — ASSESSMENT & PLAN NOTE
He is stable and does not have any symptoms of angina.  He is recovering well from CABG x 2.  Continue daily aspirin, and statin therapy.

## 2024-10-18 NOTE — ASSESSMENT & PLAN NOTE
Previous LDL is not at goal at 100, however since recent surgery, atorvastatin was increased to maximum dose 80 mg nightly.

## 2024-10-18 NOTE — ASSESSMENT & PLAN NOTE
Postoperatively he had episodes of atrial fibrillation.  He is back in sinus rhythm without any complaints palpitations.  We will plan to continue amiodarone short-term.  Continue anticoagulation with Eliquis.

## 2024-10-18 NOTE — ASSESSMENT & PLAN NOTE
Continue postsurgical precautions, and follow-ups with CT team.  Once the surgical team has cleared him, we can arrange for him to go to cardiac rehab.

## 2024-10-18 NOTE — ASSESSMENT & PLAN NOTE
Blood pressure well-controlled.  He was previously on amlodipine and lisinopril, it appears at this time he is only taking amlodipine.  Most recent creatinine is 1.31.

## 2024-10-31 ENCOUNTER — TELEPHONE (OUTPATIENT)
Dept: CARDIOLOGY | Facility: CLINIC | Age: 74
End: 2024-10-31
Payer: MEDICARE

## 2024-10-31 NOTE — TELEPHONE ENCOUNTER
ROSHAN patient. Patient home health has spot checked oxygen at 95% at weekly visits. Patient reports exertional shortness of air. Advised patient to follow up with primary care to see if oxygen still needed.

## 2024-10-31 NOTE — TELEPHONE ENCOUNTER
HH Nurse called and said they need an order sent to [facility] to  Oxygen, they will not pick it up without an order.

## 2024-11-04 NOTE — TELEPHONE ENCOUNTER
ROSHAN patient regarding recommendations. Voiced understanding. Faxed order and spoke with Leydi about picking up home oxygen.

## 2024-11-04 NOTE — TELEPHONE ENCOUNTER
Actually we spoke about at his previous office visit.  He only had to use it following his recent CABG, we may provide a order for home health to discontinue oxygen, and notify [facility] they will be able to  oxygen.

## 2024-12-09 NOTE — PROGRESS NOTES
Subjective   The ABCs of the Annual Wellness Visit  Medicare Wellness Visit      Viral Bach is a 74 y.o. patient who presents for a Medicare Wellness Visit.    The following portions of the patient's history were reviewed and   updated as appropriate: allergies, current medications, past family history, past medical history, past social history, past surgical history, and problem list.    Compared to one year ago, the patient's physical   health is better.  Compared to one year ago, the patient's mental   health is the same.    Recent Hospitalizations:  He was admitted within the past 365 days at [facility] for a CABG.     Current Medical Providers:  Patient Care Team:  Shira Snyder APRN as PCP - General (Nurse Practitioner)    Outpatient Medications Prior to Visit   Medication Sig Dispense Refill   • amiodarone (PACERONE) 200 MG tablet  Tab, Take 2 tabs oral twice daily x5 days, then take 1 tab oral twice daily x 5 days, then take 1 tab oral once daily.     • amLODIPine (NORVASC) 10 MG tablet Take 1 tablet by mouth Daily. 90 tablet 1   • apixaban (ELIQUIS) 5 MG tablet tablet Take 1 tablet by mouth Every 12 (Twelve) Hours.     • aspirin 81 MG EC tablet Take 1 tablet by mouth Daily.     • atorvastatin (LIPITOR) 80 MG tablet Take 1 tablet by mouth Daily.     • tamsulosin (FLOMAX) 0.4 MG capsule 24 hr capsule Take 1 capsule by mouth Daily. 90 capsule 1   • furosemide (LASIX) 20 MG tablet Take 1 tab oral twice daily x 5 days, then take 1 tab oral once daily x 5 days, then take 1 tab oral once daily as needed for shortness of breath or swelling. (Patient not taking: Reported on 12/10/2024)     • furosemide (LASIX) 20 MG tablet 1 tablet. (Patient not taking: Reported on 12/10/2024)     • HYDROcodone-acetaminophen (NORCO) 5-325 MG per tablet Take 1 tablet by mouth Every 6 (Six) Hours As Needed. (Patient not taking: Reported on 12/10/2024)     • sennosides-docusate (EQL Stool Softener/Stimulant)  "8.6-50 MG per tablet Take 1 tablet by mouth 2 (Two) Times a Day. (Patient not taking: Reported on 12/10/2024)       No facility-administered medications prior to visit.     No opioid medication identified on active medication list. I have reviewed chart for other potential  high risk medication/s and harmful drug interactions in the elderly.      Aspirin is on active medication list. Aspirin use is indicated based on review of current medical condition/s. Pros and cons of this therapy have been discussed today. Benefits of this medication outweigh potential harm.  Patient has been encouraged to continue taking this medication.  .      Patient Active Problem List   Diagnosis   • Essential hypertension   • Dizziness   • Erectile dysfunction   • Stage 3b chronic kidney disease   • Prediabetes   • Benign prostatic hyperplasia with nocturia   • Exertional shortness of breath   • Mixed hyperlipidemia   • Angina pectoris   • Abnormal findings on diagnostic imaging of heart and coronary circulation   • Cardiac disease   • S/P CABG x 2   • Coronary artery disease involving coronary bypass graft of native heart without angina pectoris   • PAF (paroxysmal atrial fibrillation)     Advance Care Planning Advance Directive is not on file.  ACP discussion was held with the patient during this visit. Patient does not have an advance directive, information provided.            Objective   Vitals:    12/10/24 1333   BP: 144/63   BP Location: Left arm   Patient Position: Sitting   Pulse: 63   Temp: 97.6 °F (36.4 °C)   TempSrc: Oral   SpO2: 97%  Comment: room air   Weight: 69.5 kg (153 lb 3.2 oz)   Height: 172.7 cm (67.99\")   PainSc: 0-No pain       Estimated body mass index is 23.3 kg/m² as calculated from the following:    Height as of this encounter: 172.7 cm (67.99\").    Weight as of this encounter: 69.5 kg (153 lb 3.2 oz).    BMI is within normal parameters. No other follow-up for BMI required.       Does the patient have evidence " of cognitive impairment? No                                                                                                Health  Risk Assessment    Smoking Status:  Social History     Tobacco Use   Smoking Status Former   • Current packs/day: 1.00   • Average packs/day: 1 pack/day for 53.9 years (53.9 ttl pk-yrs)   • Types: Cigarettes   • Start date:    Smokeless Tobacco Never     Alcohol Consumption:  Social History     Substance and Sexual Activity   Alcohol Use Yes    Comment: on weekends       Fall Risk Screen  STEADI Fall Risk Assessment was completed, and patient is at LOW risk for falls.Assessment completed on:12/10/2024    Depression Screening   Little interest or pleasure in doing things? Not at all   Feeling down, depressed, or hopeless? Not at all   PHQ-2 Total Score 0      Health Habits and Functional and Cognitive Screenin/10/2024     1:00 PM   Functional & Cognitive Status   Do you have difficulty preparing food and eating? No   Do you have difficulty bathing yourself, getting dressed or grooming yourself? No   Do you have difficulty using the toilet? No   Do you have difficulty moving around from place to place? No   Do you have trouble with steps or getting out of a bed or a chair? No   Current Diet Well Balanced Diet   Dental Exam Up to date   Eye Exam Not up to date   Exercise (times per week) 3 times per week   Current Exercises Include Walking   Do you need help using the phone?  No   Are you deaf or do you have serious difficulty hearing?  No   Do you need help to go to places out of walking distance? No   Do you need help shopping? No   Do you need help preparing meals?  No   Do you need help with housework?  No   Do you need help with laundry? No   Do you need help taking your medications? No   Do you need help managing money? No   Do you ever drive or ride in a car without wearing a seat belt? No   Have you felt unusual stress, anger or loneliness in the last month? No   Who  do you live with? Alone   If you need help, do you have trouble finding someone available to you? No   Have you been bothered in the last four weeks by sexual problems? No   Do you have difficulty concentrating, remembering or making decisions? No           Age-appropriate Screening Schedule:  Refer to the list below for future screening recommendations based on patient's age, sex and/or medical conditions. Orders for these recommended tests are listed in the plan section. The patient has been provided with a written plan.    Health Maintenance List  Health Maintenance   Topic Date Due   • COLORECTAL CANCER SCREENING  Never done   • ZOSTER VACCINE (1 of 2) 12/10/2024 (Originally 5/31/2000)   • INFLUENZA VACCINE  03/31/2025 (Originally 7/1/2024)   • COVID-19 Vaccine (2 - 2024-25 season) 10/01/2025 (Originally 9/1/2024)   • Pneumococcal Vaccine 65+ (1 of 1 - PCV) 12/10/2025 (Originally 5/31/2015)   • TDAP/TD VACCINES (1 - Tdap) 12/10/2025 (Originally 5/31/1969)   • LIPID PANEL  06/19/2025   • ANNUAL WELLNESS VISIT  12/10/2025   • HEPATITIS C SCREENING  Completed   • AAA SCREEN (ONE-TIME)  Completed   • LUNG CANCER SCREENING  Discontinued   Declines vaccination in office today.                                                                                                                                             CMS Preventative Services Quick Reference  Risk Factors Identified During Encounter  Immunizations Discussed/Encouraged: Td, Prevnar 20 (Pneumococcal 20-valent conjugate), and Shingrix    The above risks/problems have been discussed with the patient.  Pertinent information has been shared with the patient in the After Visit Summary.  An After Visit Summary and PPPS were made available to the patient.    Follow Up:   Next Medicare Wellness visit to be scheduled in 1 year.         Additional E&M Note during same encounter follows:  Patient has additional, significant, and separately identifiable  "condition(s)/problem(s) that require work above and beyond the Medicare Wellness Visit     Chief Complaint  Medicare Wellness-subsequent (Discuss Tadalafil rx/)    Subjective   CAD: He does follow with cardiology and recently had a CABG this year.  He last saw the cardiologist in October and has an appointment next month.  He is taking aspirin and atorvastatin 80 mg daily.  He also had postop A-fib.  It was recommended to continue the amiodarone short-term, but continue anticoagulation with Eliquis.    HTN: He is taking amlodipine 10 mg daily.        Review of Systems   Constitutional:  Negative for chills and fever.   HENT:  Negative for trouble swallowing.    Eyes:  Negative for pain and visual disturbance.   Respiratory:  Positive for shortness of breath. Negative for cough.    Cardiovascular:  Positive for chest pain (soreness bilateral chest with movement). Negative for leg swelling.   Gastrointestinal:  Negative for diarrhea, nausea and vomiting.   Endocrine: Negative for cold intolerance and heat intolerance.   Genitourinary:  Negative for dysuria and hematuria.   Musculoskeletal:  Negative for arthralgias.   Neurological:  Negative for confusion.   Hematological:  Bruises/bleeds easily.              Objective   Vital Signs:  /63 (BP Location: Left arm, Patient Position: Sitting)   Pulse 63   Temp 97.6 °F (36.4 °C) (Oral)   Ht 172.7 cm (67.99\")   Wt 69.5 kg (153 lb 3.2 oz)   SpO2 97% Comment: room air  BMI 23.30 kg/m²   Physical Exam                  Assessment and Plan            Medicare annual wellness visit, subsequent         Colon cancer screening    Orders:  •  Cologuard - Stool, Per Rectum; Future    S/P CABG x 2  Continue follow-up with cardiology.  He has fasting lab orders to be completed prior to his next appointment in January with his cardiologist.  He is currently taking atorvastatin milligrams daily.       PAF (paroxysmal atrial fibrillation)  He is currently taking Eliquis.   "     Essential hypertension  Hypertension is borderline  Continue current treatment regimen.  Dietary sodium restriction.  Weight loss.  Regular aerobic exercise.  Blood pressure will be reassessedin 6 months.  Continue amlodipine 10 mg daily.       Erectile dysfunction, unspecified erectile dysfunction type  We have discussed that he needs to get cleared from his cardiologist for sexual activity and taking medications for ED.               Follow Up   No follow-ups on file.  Patient was given instructions and counseling regarding his condition or for health maintenance advice. Please see specific information pulled into the AVS if appropriate.

## 2024-12-10 ENCOUNTER — OFFICE VISIT (OUTPATIENT)
Dept: FAMILY MEDICINE CLINIC | Age: 74
End: 2024-12-10
Payer: MEDICARE

## 2024-12-10 VITALS
TEMPERATURE: 97.6 F | HEART RATE: 63 BPM | BODY MASS INDEX: 23.22 KG/M2 | OXYGEN SATURATION: 97 % | HEIGHT: 68 IN | SYSTOLIC BLOOD PRESSURE: 144 MMHG | DIASTOLIC BLOOD PRESSURE: 63 MMHG | WEIGHT: 153.2 LBS

## 2024-12-10 DIAGNOSIS — Z12.11 COLON CANCER SCREENING: ICD-10-CM

## 2024-12-10 DIAGNOSIS — N52.9 ERECTILE DYSFUNCTION, UNSPECIFIED ERECTILE DYSFUNCTION TYPE: ICD-10-CM

## 2024-12-10 DIAGNOSIS — Z95.1 S/P CABG X 2: ICD-10-CM

## 2024-12-10 DIAGNOSIS — I10 ESSENTIAL HYPERTENSION: ICD-10-CM

## 2024-12-10 DIAGNOSIS — I48.0 PAF (PAROXYSMAL ATRIAL FIBRILLATION): ICD-10-CM

## 2024-12-10 DIAGNOSIS — Z00.00 MEDICARE ANNUAL WELLNESS VISIT, SUBSEQUENT: Primary | ICD-10-CM

## 2024-12-10 PROCEDURE — 1170F FXNL STATUS ASSESSED: CPT | Performed by: NURSE PRACTITIONER

## 2024-12-10 PROCEDURE — 3078F DIAST BP <80 MM HG: CPT | Performed by: NURSE PRACTITIONER

## 2024-12-10 PROCEDURE — 99214 OFFICE O/P EST MOD 30 MIN: CPT | Performed by: NURSE PRACTITIONER

## 2024-12-10 PROCEDURE — G0439 PPPS, SUBSEQ VISIT: HCPCS | Performed by: NURSE PRACTITIONER

## 2024-12-10 PROCEDURE — 1159F MED LIST DOCD IN RCRD: CPT | Performed by: NURSE PRACTITIONER

## 2024-12-10 PROCEDURE — 1126F AMNT PAIN NOTED NONE PRSNT: CPT | Performed by: NURSE PRACTITIONER

## 2024-12-10 PROCEDURE — 96160 PT-FOCUSED HLTH RISK ASSMT: CPT | Performed by: NURSE PRACTITIONER

## 2024-12-10 PROCEDURE — 3077F SYST BP >= 140 MM HG: CPT | Performed by: NURSE PRACTITIONER

## 2024-12-10 PROCEDURE — 1160F RVW MEDS BY RX/DR IN RCRD: CPT | Performed by: NURSE PRACTITIONER

## 2024-12-10 RX ORDER — FUROSEMIDE 20 MG/1
20 TABLET ORAL
COMMUNITY
Start: 2024-10-02 | End: 2024-12-10

## 2024-12-10 NOTE — ASSESSMENT & PLAN NOTE
Continue follow-up with cardiology.  He has fasting lab orders to be completed prior to his next appointment in January with his cardiologist.  He is currently taking atorvastatin milligrams daily.

## 2024-12-10 NOTE — ASSESSMENT & PLAN NOTE
We have discussed that he needs to get cleared from his cardiologist for sexual activity and taking medications for ED.

## 2024-12-10 NOTE — ASSESSMENT & PLAN NOTE
Hypertension is borderline  Continue current treatment regimen.  Dietary sodium restriction.  Weight loss.  Regular aerobic exercise.  Blood pressure will be reassessedin 6 months.  Continue amlodipine 10 mg daily.

## 2024-12-23 DIAGNOSIS — R19.5 POSITIVE COLORECTAL CANCER SCREENING USING COLOGUARD TEST: Primary | ICD-10-CM

## 2025-01-17 ENCOUNTER — TELEPHONE (OUTPATIENT)
Dept: CARDIOLOGY | Facility: CLINIC | Age: 75
End: 2025-01-17
Payer: MEDICARE

## 2025-01-17 NOTE — TELEPHONE ENCOUNTER
He may proceed with upcoming colonoscopy and/or EGD at moderate risk, and may hold Eliquis for 2 days prior to procedure, and aspirin up to 7 days prior to procedure.

## 2025-01-17 NOTE — TELEPHONE ENCOUNTER
Procedure: Colonoscopy and/ or EGD- Positive Cologaurd     Medication Directive: Eliquis and Aspirin    PMH: CAD, s/p CABG X 2, HTN, HLD, PAF    Last Seen: 10/17/2024

## 2025-01-20 ENCOUNTER — LAB (OUTPATIENT)
Dept: LAB | Facility: HOSPITAL | Age: 75
End: 2025-01-20
Payer: MEDICARE

## 2025-01-20 DIAGNOSIS — I48.0 PAF (PAROXYSMAL ATRIAL FIBRILLATION): ICD-10-CM

## 2025-01-20 DIAGNOSIS — E78.2 MIXED HYPERLIPIDEMIA: ICD-10-CM

## 2025-01-20 LAB
ALBUMIN SERPL-MCNC: 4 G/DL (ref 3.5–5.2)
ALBUMIN/GLOB SERPL: 1.1 G/DL
ALP SERPL-CCNC: 135 U/L (ref 39–117)
ALT SERPL W P-5'-P-CCNC: 20 U/L (ref 1–41)
ANION GAP SERPL CALCULATED.3IONS-SCNC: 14.6 MMOL/L (ref 5–15)
AST SERPL-CCNC: 17 U/L (ref 1–40)
BILIRUB SERPL-MCNC: 0.2 MG/DL (ref 0–1.2)
BUN SERPL-MCNC: 17 MG/DL (ref 8–23)
BUN/CREAT SERPL: 9.2 (ref 7–25)
CALCIUM SPEC-SCNC: 9.4 MG/DL (ref 8.6–10.5)
CHLORIDE SERPL-SCNC: 97 MMOL/L (ref 98–107)
CHOLEST SERPL-MCNC: 119 MG/DL (ref 0–200)
CO2 SERPL-SCNC: 26.4 MMOL/L (ref 22–29)
CREAT SERPL-MCNC: 1.84 MG/DL (ref 0.76–1.27)
DEPRECATED RDW RBC AUTO: 44 FL (ref 37–54)
EGFRCR SERPLBLD CKD-EPI 2021: 38 ML/MIN/1.73
ERYTHROCYTE [DISTWIDTH] IN BLOOD BY AUTOMATED COUNT: 12.9 % (ref 12.3–15.4)
GLOBULIN UR ELPH-MCNC: 3.5 GM/DL
GLUCOSE SERPL-MCNC: 136 MG/DL (ref 65–99)
HCT VFR BLD AUTO: 41.5 % (ref 37.5–51)
HDLC SERPL-MCNC: 76 MG/DL (ref 40–60)
HGB BLD-MCNC: 13.6 G/DL (ref 13–17.7)
LDLC SERPL CALC-MCNC: 29 MG/DL (ref 0–100)
LDLC/HDLC SERPL: 0.39 {RATIO}
MCH RBC QN AUTO: 30.2 PG (ref 26.6–33)
MCHC RBC AUTO-ENTMCNC: 32.8 G/DL (ref 31.5–35.7)
MCV RBC AUTO: 92.2 FL (ref 79–97)
PLATELET # BLD AUTO: 281 10*3/MM3 (ref 140–450)
PMV BLD AUTO: 9.8 FL (ref 6–12)
POTASSIUM SERPL-SCNC: 3.9 MMOL/L (ref 3.5–5.2)
PROT SERPL-MCNC: 7.5 G/DL (ref 6–8.5)
RBC # BLD AUTO: 4.5 10*6/MM3 (ref 4.14–5.8)
SODIUM SERPL-SCNC: 138 MMOL/L (ref 136–145)
TRIGL SERPL-MCNC: 66 MG/DL (ref 0–150)
TSH SERPL DL<=0.05 MIU/L-ACNC: 1.89 UIU/ML (ref 0.27–4.2)
VLDLC SERPL-MCNC: 14 MG/DL (ref 5–40)
WBC NRBC COR # BLD AUTO: 11.49 10*3/MM3 (ref 3.4–10.8)

## 2025-01-20 PROCEDURE — 80053 COMPREHEN METABOLIC PANEL: CPT

## 2025-01-20 PROCEDURE — 85027 COMPLETE CBC AUTOMATED: CPT

## 2025-01-20 PROCEDURE — 80061 LIPID PANEL: CPT

## 2025-01-20 PROCEDURE — 36415 COLL VENOUS BLD VENIPUNCTURE: CPT

## 2025-01-20 PROCEDURE — 84443 ASSAY THYROID STIM HORMONE: CPT

## 2025-01-20 NOTE — TELEPHONE ENCOUNTER
Caller: Viral Bach    Relationship: Self    Best call back number: [phone] (home)      Requested Prescriptions:   Requested Prescriptions     Pending Prescriptions Disp Refills    apixaban (ELIQUIS) 5 MG tablet tablet 60 tablet      Sig: Take 1 tablet by mouth Every 12 (Twelve) Hours.        Pharmacy where request should be sent: [id number] - [facility] [place] - [address] AT [facility] [address] [facility] - [phone] [facility] [phone] FX     Last office visit with prescribing clinician: 9/4/2024   Last telemedicine visit with prescribing clinician: Visit date not found   Next office visit with prescribing clinician: 1/22/2025     Additional details provided by patient: PT HAS ONE MORE PILL LEFT     Does the patient have less than a 3 day supply:  [x] Yes  [] No    Would you like a call back once the refill request has been completed: [] Yes [x] No    If the office needs to give you a call back, can they leave a voicemail: [] Yes [] No    Loretta Cedillo Rep   01/20/25 14:12 EST

## 2025-01-21 NOTE — TELEPHONE ENCOUNTER
Rx Refill Note  Requested Prescriptions     Pending Prescriptions Disp Refills    apixaban (ELIQUIS) 5 MG tablet tablet 60 tablet      Sig: Take 1 tablet by mouth Every 12 (Twelve) Hours.        LAST OFFICE VISIT:  10/17/2024     NEXT OFFICE VISIT:  1/22/2025     Does the medication requests match the last office note:    [x] Yes   [] No    Does this refill request meet protocol details for MA to approve:     [x] Yes   [] No

## 2025-01-22 ENCOUNTER — OFFICE VISIT (OUTPATIENT)
Dept: CARDIOLOGY | Facility: CLINIC | Age: 75
End: 2025-01-22
Payer: MEDICARE

## 2025-01-22 VITALS
HEART RATE: 71 BPM | SYSTOLIC BLOOD PRESSURE: 149 MMHG | DIASTOLIC BLOOD PRESSURE: 59 MMHG | WEIGHT: 150 LBS | BODY MASS INDEX: 22.73 KG/M2 | HEIGHT: 68 IN

## 2025-01-22 DIAGNOSIS — I10 ESSENTIAL HYPERTENSION: ICD-10-CM

## 2025-01-22 DIAGNOSIS — I25.810 CORONARY ARTERY DISEASE INVOLVING CORONARY BYPASS GRAFT OF NATIVE HEART WITHOUT ANGINA PECTORIS: Primary | ICD-10-CM

## 2025-01-22 DIAGNOSIS — I48.0 PAF (PAROXYSMAL ATRIAL FIBRILLATION): ICD-10-CM

## 2025-01-22 DIAGNOSIS — E78.2 MIXED HYPERLIPIDEMIA: ICD-10-CM

## 2025-01-22 RX ORDER — ATORVASTATIN CALCIUM 40 MG/1
40 TABLET, FILM COATED ORAL NIGHTLY
Qty: 90 TABLET | Refills: 3 | Status: SHIPPED | OUTPATIENT
Start: 2025-01-22

## 2025-01-22 RX ORDER — SILDENAFIL 100 MG/1
100 TABLET, FILM COATED ORAL DAILY PRN
COMMUNITY

## 2025-01-24 PROBLEM — R93.1 ABNORMAL FINDINGS ON DIAGNOSTIC IMAGING OF HEART AND CORONARY CIRCULATION: Status: RESOLVED | Noted: 2024-09-09 | Resolved: 2025-01-24

## 2025-01-24 PROBLEM — I20.9 ANGINA PECTORIS: Status: RESOLVED | Noted: 2024-09-09 | Resolved: 2025-01-24

## 2025-01-24 RX ORDER — SILDENAFIL 100 MG/1
100 TABLET, FILM COATED ORAL DAILY PRN
OUTPATIENT
Start: 2025-01-24

## 2025-01-24 NOTE — TELEPHONE ENCOUNTER
Caller: Viral Bach    Relationship: Self    Best call back number: [phone]     Requested Prescriptions:   Requested Prescriptions     Pending Prescriptions Disp Refills    sildenafil (VIAGRA) 100 MG tablet       Sig: Take 1 tablet by mouth Daily As Needed for Erectile Dysfunction.        Pharmacy where request should be sent: [id number] - [place], [place] - [address] [place] [facility] [address]  - [phone]  - [phone] FX     Last office visit with prescribing clinician: 12/10/2024   Last telemedicine visit with prescribing clinician: Visit date not found   Next office visit with prescribing clinician: Visit date not found     Additional details provided by patient: COMPLETELY OUT OF MEDICATION.      Does the patient have less than a 3 day supply:  [x] Yes  [] No    Loretta Whitt Rep   01/24/25 13:40 EST

## 2025-01-24 NOTE — ASSESSMENT & PLAN NOTE
He is currently stable with no angina.  Surgical incision healed well from recent CABG.  Recommend continue aspirin, statins.  Will introduce beta-blockers during subsequent follow-up visits.      He was previously on sildenafil for erectile dysfunction.  He currently has no angina.  He is 4 months past CABG and LV systolic function is preserved.  He is not on nitroglycerin.  From cardiac standpoint, it is safe to restart sildenafil as needed.

## 2025-01-24 NOTE — PROGRESS NOTES
CARDIOLOGY FOLLOW-UP PROGRESS NOTE        Chief Complaint  Follow-up, Shortness of Breath, Coronary Artery Disease, and Hyperlipidemia    Subjective            Viral Bach presents to [facility] CARDIOLOG[facility]  History of Present Illness    Mr. Bach is here for a follow-up visit.  He underwent two-vessel CABG in September 2024.  Today he denies any complaints.  Surgical incision healed well.  He has no chest pain.  He continues to report shortness of breath but better than previous levels.  Denies palpitations.  He is currently not taking amiodarone, ran out of refills.  He also ran out of 80 mg of atorvastatin hence restarted taking previous prescription of 40 mg nightly.    Past History:    Coronary artery disease : Index presentation is angina pectoris in September 2024.  Abnormal stress test, cardiac cath showed multivessel disease.  Status post CABG on 9/24/2024 by Dr. Degroot at [facility] ( LIMA to LAD, SVG to RCA).     Postoperative atrial fibrillation, discharged on amiodarone and Eliquis  Mixed hyperlipidemia  Essential hypertension    Medical History:  Past Medical History:   Diagnosis Date    Angina pectoris     BPH (benign prostatic hyperplasia)     Essential (primary) hypertension     Helicobacter pylori (H. pylori) as the cause of diseases classified elsewhere     NO CURRENT ISSUES    Hyperlipidemia     Lumbago with sciatica, left side     Pain in left hip        Family History: family history includes Coronary artery disease in an other family member; Diabetes type II in an other family member; Heart attack in an other family member; Hypertension in an other family member; Peripheral vascular disease in an other family member.     Social History: reports that he has quit smoking. His smoking use included cigarettes. He started smoking about 54 years ago. He has a 54.1 pack-year smoking history. He has never used smokeless tobacco. He reports current alcohol use. He  "reports that he does not use drugs.    Allergies: Patient has no known allergies.    Current Outpatient Medications on File Prior to Visit   Medication Sig    amLODIPine (NORVASC) 10 MG tablet Take 1 tablet by mouth Daily.    apixaban (ELIQUIS) 5 MG tablet tablet Take 1 tablet by mouth Every 12 (Twelve) Hours.    aspirin 81 MG EC tablet Take 1 tablet by mouth Daily.    sildenafil (VIAGRA) 100 MG tablet Take 1 tablet by mouth Daily As Needed for Erectile Dysfunction.    tamsulosin (FLOMAX) 0.4 MG capsule 24 hr capsule Take 1 capsule by mouth Daily.     No current facility-administered medications on file prior to visit.          Review of Systems   Respiratory:  Positive for shortness of breath. Negative for cough and wheezing.    Cardiovascular:  Negative for chest pain, palpitations and leg swelling.   Gastrointestinal:  Negative for nausea and vomiting.   Neurological:  Negative for dizziness and syncope.        Objective     /59   Pulse 71   Ht 172.7 cm (67.99\")   Wt 68 kg (150 lb)   BMI 22.81 kg/m²       Physical Exam    General : Alert, awake, no acute distress  Neck : Supple, no carotid bruit, no jugular venous distention  CVS : Regular rate and rhythm, no murmur, rubs or gallops  Lungs: Clear to auscultation bilaterally, no crackles or rhonchi  Abdomen: Soft, nontender, bowel sounds heard in all 4 quadrants  Extremities: Warm, well-perfused, no pedal edema    Result Review :     The following data was reviewed by: Rip Lizarraga MD on 01/22/2025:    CMP          8/26/2024    10:26 9/17/2024    06:49 1/20/2025    11:39   CMP   Glucose 121  122  136    BUN 13  17  17    Creatinine 1.39  1.31  1.84    EGFR 53.2  57.1  38.0    Sodium 137  138  138    Potassium 4.5  4.6  3.9    Chloride 101  103  97    Calcium 9.9  9.5  9.4    Total Protein   7.5    Albumin   4.0    Globulin   3.5    Total Bilirubin   0.2    Alkaline Phosphatase   135    AST (SGOT)   17    ALT (SGPT)   20    Albumin/Globulin Ratio   " 1.1    BUN/Creatinine Ratio 9.4  13.0  9.2    Anion Gap 7.4  10.5  14.6      CBC          8/26/2024    10:26 9/17/2024    06:49 1/20/2025    11:39   CBC   WBC 9.02  13.20  11.49    RBC 5.17  5.06  4.50    Hemoglobin 15.8  15.5  13.6    Hematocrit 48.2  46.1  41.5    MCV 93.2  91.1  92.2    MCH 30.6  30.6  30.2    MCHC 32.8  33.6  32.8    RDW 12.4  12.9  12.9    Platelets 252  246  281      TSH          1/20/2025    11:39   TSH   TSH 1.890      Lipid Panel          6/19/2024    11:26 1/20/2025    11:39   Lipid Panel   Total Cholesterol 174  119    Triglycerides 125  66    HDL Cholesterol 52  76    VLDL Cholesterol 22  14    LDL Cholesterol  100  29    LDL/HDL Ratio 1.87  0.39           Data reviewed: Cardiology studies        Results for orders placed during the hospital encounter of 09/05/24    Adult Transthoracic Echo Complete W/ Cont if Necessary Per Protocol    Interpretation Summary    Left ventricular systolic function is low normal. Estimated left ventricular EF = 50%    Left ventricular diastolic function is consistent with (grade I) impaired relaxation.    Aortic valvular sclerosis noted.  There is no hemodynamically significant stenosis or regurgitation.    Estimated right ventricular systolic pressure from tricuspid regurgitation is normal (<35 mmHg).      Results for orders placed during the hospital encounter of 09/05/24    Stress Test With Myocardial Perfusion One Day    Interpretation Summary    Abnormal myocardial SPECT stress test showing a small to moderate-sized apical and distal anterior wall ischemia of moderate intensity (SDS 4)    Left ventricular ejection fraction is normal (Calculated EF = 56%).    Diaphragmatic attenuation artifact is present.    Exercise capacity is mildly impaired.    EKG after regadenoson infusion did not show ischemic changes.  Frequent isolated PVCs noted during stress.    Impressions are consistent with an intermediate risk study.               Assessment and Plan         Diagnoses and all orders for this visit:    1. Coronary artery disease involving coronary bypass graft of native heart without angina pectoris (Primary)  Assessment & Plan:  He is currently stable with no angina.  Surgical incision healed well from recent CABG.  Recommend continue aspirin, statins.  Will introduce beta-blockers during subsequent follow-up visits.      He was previously on sildenafil for erectile dysfunction.  He currently has no angina.  He is 4 months past CABG and LV systolic function is preserved.  He is not on nitroglycerin.  From cardiac standpoint, it is safe to restart sildenafil as needed.      2. PAF (paroxysmal atrial fibrillation)  Assessment & Plan:  Detected during postoperative period.  He is currently in regular rhythm on auscultation and denies any palpitations.  He already stopped taking amiodarone.  No need to restart at this time.  Will continue Eliquis for another 3 months, and will be discontinued after a Holter monitor study if he remains in sinus rhythm.      3. Mixed hyperlipidemia  Assessment & Plan:  Recent labs showed LDL of 29 which is significantly lower than previous levels.  He was taking 80 mg of atorvastatin.  Will go back to the previous prescription of 40 mg nightly.    Orders:  -     atorvastatin (LIPITOR) 40 MG tablet; Take 1 tablet by mouth Every Night.  Dispense: 90 tablet; Refill: 3    4. Essential hypertension  Assessment & Plan:  Blood pressure is reasonably well-controlled.  Continue amlodipine.  Discussed about low-sodium diet.  Carvedilol may be added a second agent if blood pressure remains elevated during subsequent follow-up visits.  Continue amlodipine.                Follow Up     Return in about 3 months (around 4/22/2025) for Next scheduled follow up, with ANA Morel.    Patient was given instructions and counseling regarding his condition or for health maintenance advice. Please see specific information pulled into the AVS if  appropriate.

## 2025-01-24 NOTE — ASSESSMENT & PLAN NOTE
Blood pressure is reasonably well-controlled.  Continue amlodipine.  Discussed about low-sodium diet.  Carvedilol may be added a second agent if blood pressure remains elevated during subsequent follow-up visits.  Continue amlodipine.

## 2025-01-24 NOTE — ASSESSMENT & PLAN NOTE
Detected during postoperative period.  He is currently in regular rhythm on auscultation and denies any palpitations.  He already stopped taking amiodarone.  No need to restart at this time.  Will continue Eliquis for another 3 months, and will be discontinued after a Holter monitor study if he remains in sinus rhythm.

## 2025-01-24 NOTE — ASSESSMENT & PLAN NOTE
Recent labs showed LDL of 29 which is significantly lower than previous levels.  He was taking 80 mg of atorvastatin.  Will go back to the previous prescription of 40 mg nightly.

## 2025-01-28 NOTE — PROGRESS NOTES
"Chief Complaint  Erectile Dysfunction (Med refills/)    Subjective          Viral Bach presents to [facility]  History of Present Illness  He underwent a CABG x 2 vessels September 2024.  He recently saw his cardiologist, who stated that it was safe to restart did not feel as needed as he is 4 months post CABG, he is not having chest pain, LV systolic function is preserved, and he is not on NTG.      Objective   Vital Signs:   /65 (BP Location: Left arm, Patient Position: Sitting)   Pulse 78   Ht 172.7 cm (67.99\")   Wt 67 kg (147 lb 12.8 oz)   SpO2 94% Comment: room air  BMI 22.48 kg/m²     Physical Exam  Constitutional:       General: He is not in acute distress.     Appearance: Normal appearance. He is normal weight.   HENT:      Head: Normocephalic.   Eyes:      Pupils: Pupils are equal, round, and reactive to light.      Visual Fields: Right eye visual fields normal and left eye visual fields normal.   Neck:      Trachea: Trachea normal.   Cardiovascular:      Rate and Rhythm: Normal rate and regular rhythm.      Heart sounds: Normal heart sounds.   Pulmonary:      Effort: Pulmonary effort is normal.      Breath sounds: Normal breath sounds and air entry.   Musculoskeletal:      Right lower leg: No edema.      Left lower leg: No edema.   Skin:     General: Skin is warm and dry.   Neurological:      Mental Status: He is alert and oriented to person, place, and time.   Psychiatric:         Mood and Affect: Mood and affect normal.         Behavior: Behavior normal.         Thought Content: Thought content normal.        Result Review :   The following data was reviewed by: ANA Fernandez on 01/29/2025:                  Assessment and Plan    Diagnoses and all orders for this visit:    1. Erectile dysfunction, unspecified erectile dysfunction type (Primary)  -     sildenafil (VIAGRA) 100 MG tablet; Take 1 tablet by mouth Daily As Needed for Erectile " Dysfunction.  Dispense: 4 tablet; Refill: 0    His cardiologist has given him clearance to start using Viagra again.  Will send in a refill.  Will have discussed if he starts experiencing chest pain while on the medication, he is to go to the ED immediately.    BMI is within normal parameters. No other follow-up for BMI required.       Follow Up   Return if symptoms worsen or fail to improve.  Patient was given instructions and counseling regarding his condition or for health maintenance advice. Please see specific information pulled into the AVS if appropriate.

## 2025-01-29 ENCOUNTER — OFFICE VISIT (OUTPATIENT)
Dept: FAMILY MEDICINE CLINIC | Age: 75
End: 2025-01-29
Payer: MEDICARE

## 2025-01-29 VITALS
HEIGHT: 68 IN | OXYGEN SATURATION: 94 % | BODY MASS INDEX: 22.4 KG/M2 | HEART RATE: 78 BPM | SYSTOLIC BLOOD PRESSURE: 147 MMHG | WEIGHT: 147.8 LBS | DIASTOLIC BLOOD PRESSURE: 65 MMHG

## 2025-01-29 DIAGNOSIS — N52.9 ERECTILE DYSFUNCTION, UNSPECIFIED ERECTILE DYSFUNCTION TYPE: Primary | ICD-10-CM

## 2025-01-29 PROCEDURE — 3078F DIAST BP <80 MM HG: CPT | Performed by: NURSE PRACTITIONER

## 2025-01-29 PROCEDURE — 1160F RVW MEDS BY RX/DR IN RCRD: CPT | Performed by: NURSE PRACTITIONER

## 2025-01-29 PROCEDURE — 3077F SYST BP >= 140 MM HG: CPT | Performed by: NURSE PRACTITIONER

## 2025-01-29 PROCEDURE — 1159F MED LIST DOCD IN RCRD: CPT | Performed by: NURSE PRACTITIONER

## 2025-01-29 PROCEDURE — 99213 OFFICE O/P EST LOW 20 MIN: CPT | Performed by: NURSE PRACTITIONER

## 2025-01-29 PROCEDURE — 1126F AMNT PAIN NOTED NONE PRSNT: CPT | Performed by: NURSE PRACTITIONER

## 2025-01-29 RX ORDER — SILDENAFIL 100 MG/1
100 TABLET, FILM COATED ORAL DAILY PRN
Qty: 4 TABLET | Refills: 0 | Status: SHIPPED | OUTPATIENT
Start: 2025-01-29

## 2025-02-06 ENCOUNTER — TELEPHONE (OUTPATIENT)
Dept: FAMILY MEDICINE CLINIC | Age: 75
End: 2025-02-06
Payer: MEDICARE

## 2025-02-06 NOTE — TELEPHONE ENCOUNTER
Caller: Viral Bach    Relationship: Self    Best call back number: [phone]     What medication are you requesting:       Have you had these symptoms before:    [x] Yes  [] No    Have you been treated for these symptoms before:   [x] Yes  [] No    If a prescription is needed, what is your preferred pharmacy and phone number: [id number] [place] - [address] [place] [facility] [address] [facility] - [phone] [facility] [phone] FX     Additional notes: PATIENT STATES THAT THIS WAS PRESCRIBED BY A SURGEON THAT HE NO LONGER SEES AND HE WOULD LIKE TO KNOW IF [facility] CAN FILL THIS PRESCRIPTION.     PATIENT STATES THAT HE THINKS HE HAS ABOUT FIVE DAYS OF MEDICATION LEFT AT THIS TIME.

## 2025-02-10 ENCOUNTER — TELEPHONE (OUTPATIENT)
Dept: CARDIOLOGY | Facility: CLINIC | Age: 75
End: 2025-02-10
Payer: MEDICARE

## 2025-02-10 DIAGNOSIS — N52.9 ERECTILE DYSFUNCTION, UNSPECIFIED ERECTILE DYSFUNCTION TYPE: ICD-10-CM

## 2025-02-10 NOTE — TELEPHONE ENCOUNTER
Caller: Viral Bach    Relationship: Self    Best call back number: [phone]    Requested Prescriptions:   Requested Prescriptions     Pending Prescriptions Disp Refills    sildenafil (VIAGRA) 100 MG tablet 4 tablet 0     Sig: Take 1 tablet by mouth Daily As Needed for Erectile Dysfunction.        Pharmacy where request should be sent: [id number] - [place], [place] - [address] AT [facility] [address]  - [phone]  - [phone] FX     Last office visit with prescribing clinician: 1/29/2025   Last telemedicine visit with prescribing clinician: Visit date not found   Next office visit with prescribing clinician: Visit date not found     Additional details provided by patient:     Does the patient have less than a 3 day supply:  [x] Yes  [] No    Would you like a call back once the refill request has been completed: [] Yes [] No    If the office needs to give you a call back, can they leave a voicemail: [] Yes [] No    Loretta Lindsey Rep   02/10/25 11:27 EST

## 2025-02-10 NOTE — TELEPHONE ENCOUNTER
Caller: Isreal Viral    Relationship: Self    Best call back number: [phone]    Requested Prescriptions:   AMIODARONE 200MG      Pharmacy where request should be sent:  [facility]    Last office visit with prescribing clinician: 1/22/2025   Last telemedicine visit with prescribing clinician: Visit date not found   Next office visit with prescribing clinician: Visit date not found     Additional details provided by patient: 3 LEFT  PATIENT IS UNSURE IF HE SHOULD CONTINUE TO TAKE THESE OR NOT    Does the patient have less than a 3 day supply:  [x] Yes  [] No    Would you like a call back once the refill request has been completed: [x] Yes [] No    If the office needs to give you a call back, can they leave a voicemail: [] Yes [] No    Loretta Mak Rep   02/10/25 09:49 EST

## 2025-02-11 RX ORDER — SILDENAFIL 100 MG/1
100 TABLET, FILM COATED ORAL DAILY PRN
Qty: 4 TABLET | Refills: 0 | OUTPATIENT
Start: 2025-02-11

## 2025-02-13 NOTE — TELEPHONE ENCOUNTER
PATIENT HAS 1 PILL LEFT. HE NEEDS THIS FILLED ASAP.   [Normal Growth] : growth [Normal Development] : development [None] : No known medical problems [No Elimination Concerns] : elimination [No Feeding Concerns] : feeding [No Skin Concerns] : skin [Normal Sleep Pattern] : sleep [Assessment of Language Development] : assessment of language development [Temperament and Behavior] : temperament and behavior [Toilet Training] : toilet training [TV Viewing] : tv viewing [Safety] : safety [No Medications] : ~He/She~ is not on any medications [Mother] : mother

## 2025-02-14 ENCOUNTER — TELEPHONE (OUTPATIENT)
Dept: CARDIOLOGY | Facility: CLINIC | Age: 75
End: 2025-02-14
Payer: MEDICARE

## 2025-02-14 RX ORDER — AMIODARONE HYDROCHLORIDE 200 MG/1
200 TABLET ORAL DAILY
Qty: 30 TABLET | Refills: 2 | Status: SHIPPED | OUTPATIENT
Start: 2025-02-14

## 2025-02-14 NOTE — TELEPHONE ENCOUNTER
Patient called stating it was a mistake, he has not stopped the amiodarone 200 mg daily- today was his last pill and needs a refill if he is to continue.     Please advise.

## 2025-02-26 DIAGNOSIS — N52.9 ERECTILE DYSFUNCTION, UNSPECIFIED ERECTILE DYSFUNCTION TYPE: ICD-10-CM

## 2025-02-26 RX ORDER — SILDENAFIL 100 MG/1
100 TABLET, FILM COATED ORAL DAILY PRN
Qty: 4 TABLET | Refills: 0 | Status: SHIPPED | OUTPATIENT
Start: 2025-02-26 | End: 2025-02-27 | Stop reason: SDUPTHER

## 2025-02-27 ENCOUNTER — OFFICE VISIT (OUTPATIENT)
Dept: FAMILY MEDICINE CLINIC | Age: 75
End: 2025-02-27
Payer: MEDICARE

## 2025-02-27 VITALS
DIASTOLIC BLOOD PRESSURE: 60 MMHG | SYSTOLIC BLOOD PRESSURE: 147 MMHG | TEMPERATURE: 97.9 F | OXYGEN SATURATION: 98 % | HEART RATE: 64 BPM | WEIGHT: 150 LBS | BODY MASS INDEX: 22.73 KG/M2 | HEIGHT: 68 IN

## 2025-02-27 DIAGNOSIS — N18.32 STAGE 3B CHRONIC KIDNEY DISEASE: Primary | ICD-10-CM

## 2025-02-27 DIAGNOSIS — N52.9 ERECTILE DYSFUNCTION, UNSPECIFIED ERECTILE DYSFUNCTION TYPE: ICD-10-CM

## 2025-02-27 DIAGNOSIS — Z76.89 ENCOUNTER TO ESTABLISH CARE WITH NEW DOCTOR: ICD-10-CM

## 2025-02-27 DIAGNOSIS — I48.0 PAF (PAROXYSMAL ATRIAL FIBRILLATION): ICD-10-CM

## 2025-02-27 PROCEDURE — 1126F AMNT PAIN NOTED NONE PRSNT: CPT | Performed by: INTERNAL MEDICINE

## 2025-02-27 PROCEDURE — 1159F MED LIST DOCD IN RCRD: CPT | Performed by: INTERNAL MEDICINE

## 2025-02-27 PROCEDURE — 1160F RVW MEDS BY RX/DR IN RCRD: CPT | Performed by: INTERNAL MEDICINE

## 2025-02-27 PROCEDURE — 3077F SYST BP >= 140 MM HG: CPT | Performed by: INTERNAL MEDICINE

## 2025-02-27 PROCEDURE — 3078F DIAST BP <80 MM HG: CPT | Performed by: INTERNAL MEDICINE

## 2025-02-27 PROCEDURE — 99214 OFFICE O/P EST MOD 30 MIN: CPT | Performed by: INTERNAL MEDICINE

## 2025-02-27 RX ORDER — SILDENAFIL 100 MG/1
100 TABLET, FILM COATED ORAL DAILY PRN
Qty: 4 TABLET | Refills: 0 | Status: SHIPPED | OUTPATIENT
Start: 2025-02-27 | End: 2025-03-07 | Stop reason: SDUPTHER

## 2025-02-27 NOTE — ASSESSMENT & PLAN NOTE
Orders:    sildenafil (VIAGRA) 100 MG tablet; Take 1 tablet by mouth Daily As Needed for Erectile Dysfunction.

## 2025-02-27 NOTE — ASSESSMENT & PLAN NOTE
Check bmp.  Creatinine 1.25mg/dl 9/23/2024, 1.84mg/dl 1/20/2024.  Renal condition is worsening.  Continue current treatment regimen.  Repeat bmp to assess egfr.  Renal condition will be reassessed in 3 months.    Orders:    Basic metabolic panel

## 2025-02-27 NOTE — PROGRESS NOTES
Chief Complaint  Establish Care (MURRAY from Shira PEREZ ), Hypertension, and Atrial Fibrillation    Subjective      Viral Bach is a 74 y.o. male who presents to [facility]     History of Present Illness  The patient presents for evaluation of heart disease, high cholesterol, prediabetes, enlarged prostate, and kidney disease.    He has a history of heart disease, having undergone bypass surgery in 09/2024. He is currently on medication for high cholesterol, primarily due to his cardiac condition. Postoperatively, he experienced an irregular heart rhythm, which was managed with Eliquis. His cardiologist plans to continue this medication until his next visit in 04/2025, after which it may be discontinued if his condition remains stable on the monitor. He reports no leg swelling. He is currently on medication for high cholesterol, primarily due to his cardiac condition. He is also on Eliquis, which was initiated postoperatively to manage an irregular heart rhythm. He is on aspirin but does not take it regularly.    He has a history of kidney stones, which have been surgically removed. He was previously referred to a nephrologist, but subsequent evaluations indicated normal kidney function. He suspects that his recent surgery may have contributed to his elevated kidney function. He reports no issues with urination, although he does wake up 2 to 3 times at night to urinate. He does not experience any difficulty in starting or stopping his urine stream.    He has an enlarged prostate. He has not had his prostate-specific antigen (PSA) levels checked and does not consult with a urologist. He was advised by another physician to increase his juice intake. He is currently taking tamsulosin for nocturia, which was prescribed by another provider.    His blood pressure readings vary, sometimes being lower than normal, occasionally reaching 130, and infrequently rising to 140. He  "resides alone.    MEDICATIONS  Current: Lipitor, Eliquis, aspirin, tamsulosin    Review of Systems  Full review of systems obtained and pertinent positives states in above HPI.  Otherwise all others reviewed and are negative.        Patient Care Team:  Matt Camargo MD as PCP - General (Internal Medicine)    Objective   Vital Signs:   Vitals:    02/27/25 1506   BP: 147/60   BP Location: Left arm   Patient Position: Sitting   Cuff Size: Adult   Pulse: 64   Temp: 97.9 °F (36.6 °C)   TempSrc: Temporal   SpO2: 98%   Weight: 68 kg (150 lb)   Height: 172.7 cm (67.99\")     Body mass index is 22.81 kg/m².    Wt Readings from Last 3 Encounters:   02/27/25 68 kg (150 lb)   01/29/25 67 kg (147 lb 12.8 oz)   01/22/25 68 kg (150 lb)     BP Readings from Last 3 Encounters:   02/27/25 147/60   01/29/25 147/65   01/22/25 149/59       Health Maintenance   Topic Date Due    ZOSTER VACCINE (1 of 2) Never done    INFLUENZA VACCINE  03/31/2025 (Originally 7/1/2024)    COVID-19 Vaccine (2 - 2024-25 season) 10/01/2025 (Originally 9/1/2024)    Pneumococcal Vaccine 50+ (1 of 1 - PCV) 12/10/2025 (Originally 5/31/2000)    TDAP/TD VACCINES (1 - Tdap) 12/10/2025 (Originally 5/31/1969)    ANNUAL WELLNESS VISIT  12/10/2025    LIPID PANEL  01/20/2026    COLORECTAL CANCER SCREENING  12/16/2027    HEPATITIS C SCREENING  Completed    AAA SCREEN ONCE  Completed    LUNG CANCER SCREENING  Discontinued       Physical Exam  Constitutional:       Appearance: Normal appearance. He is normal weight.   HENT:      Head: Normocephalic and atraumatic.      Right Ear: External ear normal.      Left Ear: External ear normal.      Nose: Nose normal.      Mouth/Throat:      Pharynx: Oropharynx is clear. No posterior oropharyngeal erythema.   Eyes:      General: No scleral icterus.     Extraocular Movements: Extraocular movements intact.      Conjunctiva/sclera: Conjunctivae normal.      Pupils: Pupils are equal, round, and reactive to light.   Neck:      " Vascular: No carotid bruit.   Cardiovascular:      Rate and Rhythm: Normal rate and regular rhythm.      Pulses: Normal pulses.      Heart sounds: Normal heart sounds. No murmur heard.     No friction rub. No gallop.   Pulmonary:      Effort: Pulmonary effort is normal.      Breath sounds: Normal breath sounds. No wheezing or rhonchi.   Abdominal:      General: Bowel sounds are normal. There is no distension.      Palpations: Abdomen is soft.      Tenderness: There is no guarding or rebound.   Musculoskeletal:         General: No swelling. Normal range of motion.      Cervical back: Normal range of motion and neck supple.      Right lower leg: No edema.      Left lower leg: No edema.   Lymphadenopathy:      Cervical: No cervical adenopathy.   Skin:     General: Skin is warm and dry.      Findings: No rash.   Neurological:      Mental Status: He is alert and oriented to person, place, and time. Mental status is at baseline.      Cranial Nerves: No cranial nerve deficit.      Motor: No weakness.   Psychiatric:         Mood and Affect: Mood normal.         Judgment: Judgment normal.          Physical Exam  Lungs were auscultated.  Heart was examined.    Vital Signs  Blood pressure is 147/60. Heart rate is 64.      Result Review   The following data was reviewed by: Matt Camargo MD on 02/27/2025:  [x]  Tests & Results  []  Hospitalization/Emergency Department/Urgent Care  []  Internal/External Consultant Notes    Results  Laboratory Studies  Cholesterol levels are normal. Kidney function test showed an increase.      Procedures          ASSESSMENT/PLAN  Assessment & Plan  Encounter to establish care with new doctor         Erectile dysfunction, unspecified erectile dysfunction type    Orders:    sildenafil (VIAGRA) 100 MG tablet; Take 1 tablet by mouth Daily As Needed for Erectile Dysfunction.    Stage 3b chronic kidney disease  Check bmp.  Creatinine 1.25mg/dl 9/23/2024, 1.84mg/dl 1/20/2024.  Renal condition is  worsening.  Continue current treatment regimen.  Repeat bmp to assess egfr.  Renal condition will be reassessed in 3 months.    Orders:    Basic metabolic panel    PAF (paroxysmal atrial fibrillation)       on amiodarone 200mg po daily and eliquis 5mg po bid for anticoagulation      Assessment & Plan  1. Cardiac disease.  He has a history of heart disease with a bypass surgery performed in September 2024. He is currently on Lipitor and Eliquis. His blood pressure is slightly elevated at 147/60, and his heart rate is 64. He reports no issues with angina or leg swelling. He will continue his current medications, including aspirin, as prescribed by his cardiologist. He will follow up with his cardiologist in April 2025 to discuss any potential changes in his medication regimen.    2. High cholesterol.  His cholesterol levels are well-managed with his current medication regimen. He will continue taking Lipitor as prescribed.    3. Prediabetes.  His hemoglobin A1c is greater than 5.7, indicating prediabetes. He will continue with lifestyle modifications, including a balanced diet and regular exercise, to manage his blood sugar levels.    4. Enlarged prostate.  He reports nocturia, getting up 2-3 times per night, but no issues with starting or stopping his urine stream. He will continue taking tamsulosin (Flomax) as prescribed. No refill is needed at this time.    5. Kidney disease.  His kidney function was noted to be elevated during his last visit, possibly due to recent surgery or dehydration. A BMP will be ordered to monitor his kidney function. He is advised to stay hydrated and report any changes in urinary habits.    Follow-up  The patient will follow up in 3 months.    PROCEDURE  The patient underwent bypass surgery in 09/2024. He also has a history of kidney stone removal.      BMI is within normal parameters. No other follow-up for BMI required.       Viral Bach  reports that he quit smoking about 5 months  ago. His smoking use included cigarettes. He started smoking about 54 years ago. He has a 53.7 pack-year smoking history. He has never used smokeless tobacco. I have educated him on the risk of diseases from using tobacco products such as cancer, COPD, and heart disease.         FOLLOW UP  Return in about 3 months (around 5/28/2025).  Patient was given instructions and counseling regarding his condition or for health maintenance advice. Please see specific information pulled into the AVS if appropriate.       Patient or patient representative verbalized consent for the use of Ambient Listening during the visit with  Matt Camargo MD for chart documentation. 3/11/2025  14:44 EDT    Please note that portions of this note were completed with a voice recognition program.      Matt Camargo MD  02/27/25  15:37 EST

## 2025-03-07 DIAGNOSIS — N52.9 ERECTILE DYSFUNCTION, UNSPECIFIED ERECTILE DYSFUNCTION TYPE: ICD-10-CM

## 2025-03-07 RX ORDER — SILDENAFIL 100 MG/1
100 TABLET, FILM COATED ORAL DAILY PRN
Qty: 4 TABLET | Refills: 0 | Status: SHIPPED | OUTPATIENT
Start: 2025-03-07

## 2025-03-07 NOTE — TELEPHONE ENCOUNTER
Caller: Viral Bach    Relationship: Self    Best call back number: [phone]     Requested Prescriptions:   Requested Prescriptions     Pending Prescriptions Disp Refills    sildenafil (VIAGRA) 100 MG tablet 4 tablet 0     Sig: Take 1 tablet by mouth Daily As Needed for Erectile Dysfunction.        Pharmacy where request should be sent: [id number] - [place], [place] - [address] AT [facility] [address] [facility] - [phone]  - [phone] FX     Last office visit with prescribing clinician: 2/27/2025   Last telemedicine visit with prescribing clinician: Visit date not found   Next office visit with prescribing clinician: 5/28/2025     Additional details provided by patient: LESS THAN THREE DAY SUPPLY.     Does the patient have less than a 3 day supply:  [x] Yes  [] No        Loretta Whitt Rep   03/07/25 12:35 EST

## 2025-03-11 PROBLEM — N40.1 ENLARGED PROSTATE WITH LOWER URINARY TRACT SYMPTOMS (LUTS): Status: ACTIVE | Noted: 2025-03-11

## 2025-03-11 PROBLEM — R07.9 CHEST PAIN ON EXERTION: Status: ACTIVE | Noted: 2022-02-14

## 2025-03-11 PROBLEM — Z86.11 PERSONAL HISTORY OF TUBERCULOSIS: Status: ACTIVE | Noted: 2024-10-17

## 2025-03-11 PROBLEM — R19.5 POSITIVE COLORECTAL CANCER SCREENING USING COLOGUARD TEST: Status: ACTIVE | Noted: 2025-01-13

## 2025-03-18 ENCOUNTER — TELEPHONE (OUTPATIENT)
Dept: FAMILY MEDICINE CLINIC | Age: 75
End: 2025-03-18
Payer: MEDICARE

## 2025-03-26 ENCOUNTER — LAB (OUTPATIENT)
Dept: LAB | Facility: HOSPITAL | Age: 75
End: 2025-03-26
Payer: MEDICARE

## 2025-03-26 LAB
ANION GAP SERPL CALCULATED.3IONS-SCNC: 13.7 MMOL/L (ref 5–15)
BUN SERPL-MCNC: 20 MG/DL (ref 8–23)
BUN/CREAT SERPL: 10.4 (ref 7–25)
CALCIUM SPEC-SCNC: 9.4 MG/DL (ref 8.6–10.5)
CHLORIDE SERPL-SCNC: 103 MMOL/L (ref 98–107)
CO2 SERPL-SCNC: 25.3 MMOL/L (ref 22–29)
CREAT SERPL-MCNC: 1.92 MG/DL (ref 0.76–1.27)
EGFRCR SERPLBLD CKD-EPI 2021: 36.1 ML/MIN/1.73
GLUCOSE SERPL-MCNC: 152 MG/DL (ref 65–99)
POTASSIUM SERPL-SCNC: 4.3 MMOL/L (ref 3.5–5.2)
SODIUM SERPL-SCNC: 142 MMOL/L (ref 136–145)

## 2025-03-26 PROCEDURE — 80048 BASIC METABOLIC PNL TOTAL CA: CPT | Performed by: INTERNAL MEDICINE

## 2025-03-31 DIAGNOSIS — N52.9 ERECTILE DYSFUNCTION, UNSPECIFIED ERECTILE DYSFUNCTION TYPE: ICD-10-CM

## 2025-03-31 RX ORDER — SILDENAFIL 100 MG/1
100 TABLET, FILM COATED ORAL DAILY PRN
Qty: 4 TABLET | Refills: 0 | Status: SHIPPED | OUTPATIENT
Start: 2025-03-31

## 2025-03-31 NOTE — TELEPHONE ENCOUNTER
Caller: Viral Bach    Relationship: Self    Best call back number: [phone]     Requested Prescriptions:   Requested Prescriptions     Pending Prescriptions Disp Refills    sildenafil (VIAGRA) 100 MG tablet 4 tablet 0     Sig: Take 1 tablet by mouth Daily As Needed for Erectile Dysfunction.        Pharmacy where request should be sent: [id number] - [facility] [place] - [address] AT [facility] [address] [facility] - [phone]  - [phone] FX     Last office visit with prescribing clinician: 2/27/2025   Last telemedicine visit with prescribing clinician: Visit date not found   Next office visit with prescribing clinician: 5/28/2025     Additional details provided by patient: LESS THAN THREE DAY SUPPLY ON HAND.     Does the patient have less than a 3 day supply:  [x] Yes  [] No    Loretta Whitt Rep   03/31/25 11:06 EDT

## 2025-04-02 DIAGNOSIS — N18.32 STAGE 3B CHRONIC KIDNEY DISEASE: ICD-10-CM

## 2025-04-02 DIAGNOSIS — E78.2 MIXED HYPERLIPIDEMIA: ICD-10-CM

## 2025-04-02 DIAGNOSIS — R73.03 PREDIABETES: ICD-10-CM

## 2025-04-02 DIAGNOSIS — I10 ESSENTIAL HYPERTENSION: Primary | ICD-10-CM

## 2025-04-22 ENCOUNTER — OFFICE VISIT (OUTPATIENT)
Age: 75
End: 2025-04-22
Payer: MEDICARE

## 2025-04-22 VITALS — SYSTOLIC BLOOD PRESSURE: 126 MMHG | HEART RATE: 56 BPM | DIASTOLIC BLOOD PRESSURE: 60 MMHG

## 2025-04-22 DIAGNOSIS — I48.0 PAF (PAROXYSMAL ATRIAL FIBRILLATION): Primary | ICD-10-CM

## 2025-04-22 DIAGNOSIS — I10 ESSENTIAL HYPERTENSION: ICD-10-CM

## 2025-04-22 DIAGNOSIS — E78.2 MIXED HYPERLIPIDEMIA: ICD-10-CM

## 2025-04-22 DIAGNOSIS — I25.810 CORONARY ARTERY DISEASE INVOLVING CORONARY BYPASS GRAFT OF NATIVE HEART WITHOUT ANGINA PECTORIS: ICD-10-CM

## 2025-04-22 NOTE — ASSESSMENT & PLAN NOTE
Blood pressure stable and controlled.  Continue amlodipine 10 mg daily.  Encouraged patient to have a low-sodium diet.  Patient is to bring his wrist blood pressure monitoring

## 2025-04-22 NOTE — PROGRESS NOTES
Chief Complaint  Follow-up, Chest Pain, Coronary Artery Disease, and Shortness of Breath    Subjective        History of Present Illness   Viral Bach presents to [facility] CARDIOLOGY     Mr. Bach is a 74-year-old male here for 3-month follow-up.  He underwent a two-vessel CABG September 2024.  He denies any chest pain with or without exertion.  Patient reports he has stopped his amiodarone.  Patient is taking all medication as prescribed.  Patient reports overall he is feeling well.  He denies any palpitations, dyspnea, dizziness, syncope or edema.          Past History:     Coronary artery disease : Index presentation is angina pectoris in September 2024.  Abnormal stress test, cardiac cath showed multivessel disease.  Status post CABG on 9/24/2024 by Dr. Degroot at [facility] ( LIMA to LAD, SVG to RCA).      Postoperative atrial fibrillation, discharged on amiodarone and Eliquis  Mixed hyperlipidemia  Essential hypertension      Past Medical History:   Diagnosis Date    Angina pectoris     BPH (benign prostatic hyperplasia)     Essential (primary) hypertension     Helicobacter pylori (H. pylori) as the cause of diseases classified elsewhere     Hyperlipidemia     Lumbago with sciatica, left side     Pain in left hip        No Known Allergies     Past Surgical History:   Procedure Laterality Date    CARDIAC CATHETERIZATION N/A 9/17/2024    Procedure: Left Heart Cath with possible angioplasty;  Surgeon: Rip Lizarraga MD;  Location: [facility] INVASIVE LOCATION;  Service: Cardiovascular;  Laterality: N/A;    LAPAROSCOPIC CHOLECYSTECTOMY          Social History  He  reports that he quit smoking about 7 months ago. His smoking use included cigarettes. He started smoking about 54 years ago. He has a 53.7 pack-year smoking history. He has never used smokeless tobacco. He reports current alcohol use. He reports that he does not use drugs.    Family History  His family history  "includes Coronary artery disease in an other family member; Diabetes type II in an other family member; Heart attack in an other family member; Hypertension in an other family member; Peripheral vascular disease in an other family member.       Current Outpatient Medications on File Prior to Visit   Medication Sig    amLODIPine (NORVASC) 10 MG tablet Take 1 tablet by mouth Daily.    apixaban (ELIQUIS) 5 MG tablet tablet Take 1 tablet by mouth Every 12 (Twelve) Hours.    aspirin 81 MG EC tablet Take 1 tablet by mouth Daily.    atorvastatin (LIPITOR) 40 MG tablet Take 1 tablet by mouth Every Night.    sildenafil (VIAGRA) 100 MG tablet Take 1 tablet by mouth Daily As Needed for Erectile Dysfunction.    tamsulosin (FLOMAX) 0.4 MG capsule 24 hr capsule Take 1 capsule by mouth Daily.    [DISCONTINUED] amiodarone (PACERONE) 200 MG tablet Take 1 tablet by mouth Daily.     No current facility-administered medications on file prior to visit.         Review of Systems   Respiratory:  Negative for chest tightness and shortness of breath.    Cardiovascular:  Negative for chest pain, palpitations and leg swelling.   Gastrointestinal:  Negative for nausea, vomiting and indigestion.   Neurological:  Negative for dizziness, syncope and light-headedness.        Objective   Vitals:    04/22/25 1304   BP: 126/60   BP Location: Left arm   Pulse: 56         Physical Exam   General : Alert, awake, no acute distress  Neck : Supple, no carotid bruit, no jugular venous distention  CVS : Regular rate and rhythm, no murmur, no rubs or gallops  Lungs: Clear to auscultation bilaterally, no crackles or rhonchi  Abdomen: Soft, nontender, bowel sounds active  Extremities: Warm, well-perfused, no pedal edema      Result Review     The following data was reviewed by ANA Morle  No results found for: \"PROBNP\"  CMP          9/17/2024    06:49 1/20/2025    11:39 3/26/2025    11:13   CMP   Glucose 122  136  152    BUN 17 17 20    Creatinine " "1.31  1.84  1.92    EGFR 57.1  38.0  36.1    Sodium 138  138  142    Potassium 4.6  3.9  4.3    Chloride 103  97  103    Calcium 9.5  9.4  9.4    Total Protein  7.5     Albumin  4.0     Globulin  3.5     Total Bilirubin  0.2     Alkaline Phosphatase  135     AST (SGOT)  17     ALT (SGPT)  20     Albumin/Globulin Ratio  1.1     BUN/Creatinine Ratio 13.0  9.2  10.4    Anion Gap 10.5  14.6  13.7      CBC w/diff          8/26/2024    10:26 9/17/2024    06:49 1/20/2025    11:39   CBC w/Diff   WBC 9.02  13.20  11.49    RBC 5.17  5.06  4.50    Hemoglobin 15.8  15.5  13.6    Hematocrit 48.2  46.1  41.5    MCV 93.2  91.1  92.2    MCH 30.6  30.6  30.2    MCHC 32.8  33.6  32.8    RDW 12.4  12.9  12.9    Platelets 252  246  281    Neutrophil Rel %  64.5     Immature Granulocyte Rel %  0.4     Lymphocyte Rel %  22.7     Monocyte Rel %  8.9     Eosinophil Rel %  2.3     Basophil Rel %  1.2        Lab Results   Component Value Date    TSH 1.890 01/20/2025      No results found for: \"FREET4\"   No results found for: \"DDIMERQUANT\"  Magnesium   Date Value Ref Range Status   10/21/2020 2.03 1.60 - 2.30 mg/dL Final      No results found for: \"DIGOXIN\"   No results found for: \"TROPONINT\"        Lipid Panel          6/19/2024    11:26 1/20/2025    11:39   Lipid Panel   Total Cholesterol 174  119    Triglycerides 125  66    HDL Cholesterol 52  76    VLDL Cholesterol 22  14    LDL Cholesterol  100  29    LDL/HDL Ratio 1.87  0.39          Results for orders placed during the hospital encounter of 09/05/24    Adult Transthoracic Echo Complete W/ Cont if Necessary Per Protocol    Interpretation Summary    Left ventricular systolic function is low normal. Estimated left ventricular EF = 50%    Left ventricular diastolic function is consistent with (grade I) impaired relaxation.    Aortic valvular sclerosis noted.  There is no hemodynamically significant stenosis or regurgitation.    Estimated right ventricular systolic pressure from tricuspid " regurgitation is normal (<35 mmHg).    Results for orders placed during the hospital encounter of 09/05/24    Stress Test With Myocardial Perfusion One Day    Interpretation Summary    Abnormal myocardial SPECT stress test showing a small to moderate-sized apical and distal anterior wall ischemia of moderate intensity (SDS 4)    Left ventricular ejection fraction is normal (Calculated EF = 56%).    Diaphragmatic attenuation artifact is present.    Exercise capacity is mildly impaired.    EKG after regadenoson infusion did not show ischemic changes.  Frequent isolated PVCs noted during stress.    Impressions are consistent with an intermediate risk study.        Procedures        Assessment and Plan   Diagnoses and all orders for this visit:    1. PAF (paroxysmal atrial fibrillation) (Primary)  Assessment & Plan:  Patient's atrial fibrillation was detected during postoperative..  He is currently in regular rhythm on his auscultation and denies any palpitations pain.  Patient has stopped his amiodarone.  Will continue to be discontinued.  Holter monitor study was ordered today if he remains in sinus rhythm per previous notes then will discontinue Eliquis.  Continue Eliquis until Holter monitor results are available.    Orders:  -     Holter Monitor - 72 Hour Up To 15 Days; Future    2. Mixed hyperlipidemia  Assessment & Plan:   Recent lipid panel 1/20/2025 LDL 29, HDL 76, and triglycerides 66.  LDL is below goal.  Continue atorvastatin 40 mg daily.  Encouraged patient to watch diet  and exercise as tolerated      3. Essential hypertension  Assessment & Plan:  Blood pressure stable and controlled.  Continue amlodipine 10 mg daily.  Encouraged patient to have a low-sodium diet.  Patient is to bring his wrist blood pressure monitoring       4. Coronary artery disease involving coronary bypass graft of native heart without angina pectoris  Assessment & Plan:  Patient is currently stable with no angina or anginal-like  symptoms.  Continue aspirin and statin.                  Follow Up   Return in about 6 months (around 10/22/2025) for with Dr Lizarraga.    Viral Bach  reports that he quit smoking about 7 months ago. His smoking use included cigarettes. He started smoking about 54 years ago. He has a 53.7 pack-year smoking history. He has never used smokeless tobacco.          Patient was given instructions and counseling regarding his condition or for health maintenance advice. Please see specific information pulled into the AVS if appropriate.     Signed,  Inessa Cali, APRN  04/22/2025     Dictated Utilizing Dragon Dictation: Please note that portions of this note were completed with a voice recognition program.  Part of this note may be an electronic transcription/translation of spoken language to printed text using the Dragon Dictation System.

## 2025-04-22 NOTE — ASSESSMENT & PLAN NOTE
Recent lipid panel 1/20/2025 LDL 29, HDL 76, and triglycerides 66.  LDL is below goal.  Continue atorvastatin 40 mg daily.  Encouraged patient to watch diet  and exercise as tolerated

## 2025-04-23 NOTE — ASSESSMENT & PLAN NOTE
Patient is currently stable with no angina or anginal-like symptoms.  Continue aspirin and statin.

## 2025-04-23 NOTE — ASSESSMENT & PLAN NOTE
Patient's atrial fibrillation was detected during postoperative..  He is currently in regular rhythm on his auscultation and denies any palpitations pain.  Patient has stopped his amiodarone.  Will continue to be discontinued.  Holter monitor study was ordered today if he remains in sinus rhythm per previous notes then will discontinue Eliquis.  Continue Eliquis until Holter monitor results are available.

## 2025-04-28 DIAGNOSIS — N52.9 ERECTILE DYSFUNCTION, UNSPECIFIED ERECTILE DYSFUNCTION TYPE: ICD-10-CM

## 2025-04-28 RX ORDER — SILDENAFIL 100 MG/1
100 TABLET, FILM COATED ORAL DAILY PRN
Qty: 4 TABLET | Refills: 0 | Status: SHIPPED | OUTPATIENT
Start: 2025-04-28

## 2025-04-28 NOTE — TELEPHONE ENCOUNTER
Caller: Viral Bach    Relationship: Self    Best call back number: [phone]    Requested Prescriptions:   Requested Prescriptions     Pending Prescriptions Disp Refills    sildenafil (VIAGRA) 100 MG tablet 4 tablet 0     Sig: Take 1 tablet by mouth Daily As Needed for Erectile Dysfunction.        Pharmacy where request should be sent: [id number] - [facility] [place] - [address] AT [facility] OF [address] [facility] - [phone] [facility] [phone] FX     Last office visit with prescribing clinician: 2/27/2025   Last telemedicine visit with prescribing clinician: Visit date not found   Next office visit with prescribing clinician: 5/28/2025     Additional details provided by patient: PATIENT WOULD LIKE QUANTITY OF 12 INSTEAD OF 4.     Does the patient have less than a 3 day supply:  [x] Yes  [] No    Would you like a call back once the refill request has been completed: [] Yes [] No    If the office needs to give you a call back, can they leave a voicemail: [] Yes [] No    Loretta Ford Rep   04/28/25 15:01 EDT

## 2025-04-30 ENCOUNTER — TELEPHONE (OUTPATIENT)
Dept: CARDIOLOGY | Facility: CLINIC | Age: 75
End: 2025-04-30
Payer: MEDICARE

## 2025-04-30 NOTE — TELEPHONE ENCOUNTER
Procedure: Colonoscopy and/ or EGD- Positive Cologaurd      Medication Directive: Eliquis 2 days and Aspirin 7 days prior     PMH: CAD, s/p CABG X 2, HTN, HLD, PAF     Last Seen: 04/22/25    Patient was cleared previously but wanted to wait to see Cardiology first

## 2025-04-30 NOTE — TELEPHONE ENCOUNTER
Patient may proceed with surgical procedure at low risk for perioperative cardiac events.  Patient may hold Eliquis for 2 days prior to procedure and hold aspirin 7 days prior to procedure.

## 2025-05-06 ENCOUNTER — RESULTS FOLLOW-UP (OUTPATIENT)
Age: 75
End: 2025-05-06
Payer: MEDICARE

## 2025-05-06 NOTE — TELEPHONE ENCOUNTER
ROSHAN patient. Went over results and recommendation to stop Eliquis. Patient verbalized understanding and appreciation. Updated medication list

## 2025-05-23 ENCOUNTER — LAB (OUTPATIENT)
Dept: LAB | Facility: HOSPITAL | Age: 75
End: 2025-05-23
Payer: MEDICARE

## 2025-05-23 DIAGNOSIS — N18.32 STAGE 3B CHRONIC KIDNEY DISEASE: ICD-10-CM

## 2025-05-23 DIAGNOSIS — E78.2 MIXED HYPERLIPIDEMIA: ICD-10-CM

## 2025-05-23 DIAGNOSIS — I10 ESSENTIAL HYPERTENSION: ICD-10-CM

## 2025-05-23 DIAGNOSIS — R73.03 PREDIABETES: ICD-10-CM

## 2025-05-23 LAB
ALBUMIN SERPL-MCNC: 4 G/DL (ref 3.5–5.2)
ALBUMIN/GLOB SERPL: 1.3 G/DL
ALP SERPL-CCNC: 120 U/L (ref 39–117)
ALT SERPL W P-5'-P-CCNC: 19 U/L (ref 1–41)
ANION GAP SERPL CALCULATED.3IONS-SCNC: 11 MMOL/L (ref 5–15)
AST SERPL-CCNC: 16 U/L (ref 1–40)
BASOPHILS # BLD AUTO: 0.24 10*3/MM3 (ref 0–0.2)
BASOPHILS NFR BLD AUTO: 2.3 % (ref 0–1.5)
BILIRUB SERPL-MCNC: 0.2 MG/DL (ref 0–1.2)
BUN SERPL-MCNC: 22 MG/DL (ref 8–23)
BUN/CREAT SERPL: 12 (ref 7–25)
CALCIUM SPEC-SCNC: 9.2 MG/DL (ref 8.6–10.5)
CHLORIDE SERPL-SCNC: 100 MMOL/L (ref 98–107)
CHOLEST SERPL-MCNC: 133 MG/DL (ref 0–200)
CO2 SERPL-SCNC: 25 MMOL/L (ref 22–29)
CREAT SERPL-MCNC: 1.84 MG/DL (ref 0.76–1.27)
DEPRECATED RDW RBC AUTO: 43.8 FL (ref 37–54)
EGFRCR SERPLBLD CKD-EPI 2021: 38 ML/MIN/1.73
EOSINOPHIL # BLD AUTO: 0.12 10*3/MM3 (ref 0–0.4)
EOSINOPHIL NFR BLD AUTO: 1.2 % (ref 0.3–6.2)
ERYTHROCYTE [DISTWIDTH] IN BLOOD BY AUTOMATED COUNT: 12.8 % (ref 12.3–15.4)
GLOBULIN UR ELPH-MCNC: 3.2 GM/DL
GLUCOSE SERPL-MCNC: 124 MG/DL (ref 65–99)
HBA1C MFR BLD: 6.1 % (ref 4.8–5.6)
HCT VFR BLD AUTO: 41.6 % (ref 37.5–51)
HDLC SERPL-MCNC: 69 MG/DL (ref 40–60)
HGB BLD-MCNC: 14 G/DL (ref 13–17.7)
IMM GRANULOCYTES # BLD AUTO: 0.05 10*3/MM3 (ref 0–0.05)
IMM GRANULOCYTES NFR BLD AUTO: 0.5 % (ref 0–0.5)
LDLC SERPL CALC-MCNC: 42 MG/DL (ref 0–100)
LDLC/HDLC SERPL: 0.57 {RATIO}
LYMPHOCYTES # BLD AUTO: 2.46 10*3/MM3 (ref 0.7–3.1)
LYMPHOCYTES NFR BLD AUTO: 23.7 % (ref 19.6–45.3)
MCH RBC QN AUTO: 31.3 PG (ref 26.6–33)
MCHC RBC AUTO-ENTMCNC: 33.7 G/DL (ref 31.5–35.7)
MCV RBC AUTO: 92.9 FL (ref 79–97)
MONOCYTES # BLD AUTO: 0.84 10*3/MM3 (ref 0.1–0.9)
MONOCYTES NFR BLD AUTO: 8.1 % (ref 5–12)
NEUTROPHILS NFR BLD AUTO: 6.68 10*3/MM3 (ref 1.7–7)
NEUTROPHILS NFR BLD AUTO: 64.2 % (ref 42.7–76)
NRBC BLD AUTO-RTO: 0 /100 WBC (ref 0–0.2)
PLATELET # BLD AUTO: 287 10*3/MM3 (ref 140–450)
PMV BLD AUTO: 10.3 FL (ref 6–12)
POTASSIUM SERPL-SCNC: 4.8 MMOL/L (ref 3.5–5.2)
PROT SERPL-MCNC: 7.2 G/DL (ref 6–8.5)
RBC # BLD AUTO: 4.48 10*6/MM3 (ref 4.14–5.8)
SODIUM SERPL-SCNC: 136 MMOL/L (ref 136–145)
TRIGL SERPL-MCNC: 125 MG/DL (ref 0–150)
VLDLC SERPL-MCNC: 22 MG/DL (ref 5–40)
WBC NRBC COR # BLD AUTO: 10.39 10*3/MM3 (ref 3.4–10.8)

## 2025-05-23 PROCEDURE — 80061 LIPID PANEL: CPT

## 2025-05-23 PROCEDURE — 36415 COLL VENOUS BLD VENIPUNCTURE: CPT

## 2025-05-23 PROCEDURE — 80053 COMPREHEN METABOLIC PANEL: CPT

## 2025-05-23 PROCEDURE — 85025 COMPLETE CBC W/AUTO DIFF WBC: CPT

## 2025-05-23 PROCEDURE — 83036 HEMOGLOBIN GLYCOSYLATED A1C: CPT

## 2025-05-28 ENCOUNTER — OFFICE VISIT (OUTPATIENT)
Dept: FAMILY MEDICINE CLINIC | Age: 75
End: 2025-05-28
Payer: MEDICARE

## 2025-05-28 VITALS
SYSTOLIC BLOOD PRESSURE: 150 MMHG | WEIGHT: 155 LBS | HEART RATE: 66 BPM | OXYGEN SATURATION: 97 % | BODY MASS INDEX: 23.49 KG/M2 | DIASTOLIC BLOOD PRESSURE: 56 MMHG | HEIGHT: 68 IN | TEMPERATURE: 97.7 F

## 2025-05-28 DIAGNOSIS — N52.9 ERECTILE DYSFUNCTION, UNSPECIFIED ERECTILE DYSFUNCTION TYPE: ICD-10-CM

## 2025-05-28 DIAGNOSIS — N18.32 STAGE 3B CHRONIC KIDNEY DISEASE: Primary | ICD-10-CM

## 2025-05-28 DIAGNOSIS — R35.1 NOCTURIA ASSOCIATED WITH BENIGN PROSTATIC HYPERPLASIA: ICD-10-CM

## 2025-05-28 DIAGNOSIS — N40.1 NOCTURIA ASSOCIATED WITH BENIGN PROSTATIC HYPERPLASIA: ICD-10-CM

## 2025-05-28 RX ORDER — AMIODARONE HYDROCHLORIDE 200 MG/1
200 TABLET ORAL DAILY
COMMUNITY
Start: 2025-03-12 | End: 2025-05-28

## 2025-05-28 RX ORDER — AMOXICILLIN 250 MG
1 CAPSULE ORAL 2 TIMES DAILY
COMMUNITY
End: 2025-05-28

## 2025-05-28 RX ORDER — SILDENAFIL 100 MG/1
100 TABLET, FILM COATED ORAL DAILY PRN
Qty: 30 TABLET | Refills: 3 | Status: SHIPPED | OUTPATIENT
Start: 2025-05-28

## 2025-05-28 RX ORDER — TAMSULOSIN HYDROCHLORIDE 0.4 MG/1
2 CAPSULE ORAL
Qty: 90 CAPSULE | Refills: 1 | Status: SHIPPED | OUTPATIENT
Start: 2025-05-28

## 2025-05-28 RX ORDER — HYDROCODONE BITARTRATE AND ACETAMINOPHEN 5; 325 MG/1; MG/1
1 TABLET ORAL
COMMUNITY
End: 2025-05-28

## 2025-05-28 NOTE — PROGRESS NOTES
Chief Complaint  Atrial Fibrillation, Chronic Kidney Disease, and Med Refill (Pt would like his sildenafil written for 90 day supply (pharmacy advised pt this would be cheaper for him) )    Bruce Bach is a 75 y.o. male who presents to [facility]     History of Present Illness  The patient presents for evaluation of nocturia, atrial fibrillation, and chronic kidney disease.    He reports experiencing nocturia, necessitating him to rise more than twice during the night. He is currently on a regimen of Flomax 0.4 mg, administered nightly. There is a consideration to increase the dosage to 0.8 mg to better manage his symptoms.    He has a history of atrial fibrillation but has not experienced any recent episodes. He is not currently on anticoagulant therapy.    He has not previously consulted with a nephrologist. He recalls an instance where his blood pressure was monitored, and upon returning home, he was informed that the results were within normal limits, thus negating the need for further monitoring. He has discontinued the use of ibuprofen due to its potential renal effects. He maintains adequate hydration by consuming significant amounts of juice. His kidney function has shown some fluctuations, with creatinine levels increasing and then decreasing over the past year.    He had a positive screening from [facility]. He was supposed to have a colonoscopy tomorrow, but he did not find out until yesterday that he had to take some kind of prep the night before, so he postponed it until next month. He has a history of polyps from a previous colonoscopy.        Patient Care Team:  Matt Camargo MD as PCP - General (Internal Medicine)    Objective   Vital Signs:   Vitals:    05/28/25 1559   BP: 150/56   BP Location: Left arm   Patient Position: Sitting   Cuff Size: Adult   Pulse: 66   Temp: 97.7 °F (36.5 °C)   TempSrc: Temporal   SpO2: 97%   Weight: 70.3 kg (155  "lb)   Height: 172.7 cm (67.99\")     Body mass index is 23.57 kg/m².    Wt Readings from Last 3 Encounters:   05/28/25 70.3 kg (155 lb)   02/27/25 68 kg (150 lb)   01/29/25 67 kg (147 lb 12.8 oz)     BP Readings from Last 3 Encounters:   05/28/25 150/56   04/22/25 126/60   02/27/25 147/60       Health Maintenance   Topic Date Due    ZOSTER VACCINE (1 of 2) Never done    RSV Vaccine - Adults (1 - 1-dose 75+ series) Never done    LUNG CANCER SCREENING  06/17/2025    COVID-19 Vaccine (2 - 2024-25 season) 10/01/2025 (Originally 9/1/2024)    Pneumococcal Vaccine 50+ (1 of 1 - PCV) 12/10/2025 (Originally 5/31/2000)    TDAP/TD VACCINES (1 - Tdap) 12/10/2025 (Originally 5/31/1969)    INFLUENZA VACCINE  07/01/2025    ANNUAL WELLNESS VISIT  12/10/2025    LIPID PANEL  05/23/2026    COLORECTAL CANCER SCREENING  12/16/2027    HEPATITIS C SCREENING  Completed    AAA SCREEN ONCE  Completed       Physical Exam  Constitutional:       Appearance: Normal appearance. He is normal weight.   HENT:      Head: Normocephalic and atraumatic.      Right Ear: External ear normal.      Left Ear: External ear normal.      Nose: Nose normal.      Mouth/Throat:      Pharynx: Oropharynx is clear. No posterior oropharyngeal erythema.   Eyes:      General: No scleral icterus.     Extraocular Movements: Extraocular movements intact.      Conjunctiva/sclera: Conjunctivae normal.      Pupils: Pupils are equal, round, and reactive to light.   Neck:      Vascular: No carotid bruit.   Cardiovascular:      Rate and Rhythm: Normal rate and regular rhythm.      Pulses: Normal pulses.      Heart sounds: Normal heart sounds. No murmur heard.     No friction rub. No gallop.   Pulmonary:      Effort: Pulmonary effort is normal.      Breath sounds: Normal breath sounds. No wheezing or rhonchi.   Abdominal:      General: Bowel sounds are normal. There is no distension.      Palpations: Abdomen is soft.      Tenderness: There is no guarding or rebound. "   Musculoskeletal:         General: No swelling. Normal range of motion.      Cervical back: Normal range of motion and neck supple.      Right lower leg: No edema.      Left lower leg: No edema.   Lymphadenopathy:      Cervical: No cervical adenopathy.   Skin:     General: Skin is warm and dry.      Findings: No rash.   Neurological:      Mental Status: He is alert and oriented to person, place, and time. Mental status is at baseline.      Cranial Nerves: No cranial nerve deficit.      Motor: No weakness.   Psychiatric:         Mood and Affect: Mood normal.         Judgment: Judgment normal.          Physical Exam  Respiratory: Clear to auscultation, no wheezing, rales or rhonchi  Cardiovascular: Regular rate and rhythm, no murmurs, rubs, or gallops      Result Review   The following data was reviewed by: Matt Camargo MD on 05/28/2025:  [x]  Tests & Results  []  Hospitalization/Emergency Department/Urgent Care  [x]  Internal/External Consultant Notes    Results  Labs   - PSA: 08/2024, 2.6 ng/mL   - Kidney function (creatinine): 1.9 mg/dL   - Kidney function (creatinine): 1.8 mg/dL   - Liver enzyme: Slightly elevated      Procedures          ASSESSMENT/PLAN  Assessment & Plan  Erectile dysfunction, unspecified erectile dysfunction type    Orders:    sildenafil (VIAGRA) 100 MG tablet; Take 1 tablet by mouth Daily As Needed for Erectile Dysfunction.    Stage 3b chronic kidney disease      Orders:    US Renal Bilateral; Future    Ambulatory Referral to Nephrology    Nocturia associated with benign prostatic hyperplasia    Orders:    tamsulosin (FLOMAX) 0.4 MG capsule 24 hr capsule; Take 2 capsules by mouth every night at bedtime.        Assessment & Plan  1. Nocturia.  - Reports getting up more than twice at night.  - PSA level recorded as 2.6 in 08/2024, with a previous reading of 2.1.  - Next PSA test scheduled for 08/2025.  - Dosage of Flomax increased to 0.8 mg, to be taken at bedtime. Prescription refill  provided and sent to pharmacy. If PSA level exceeds 10 or if symptoms worsen, a referral to urology will be considered.    2. Atrial fibrillation.  - History of atrial fibrillation.  - Not currently on anticoagulation therapy.  - No recent episodes of atrial fibrillation reported.  - Monitoring for any changes in symptoms or need for anticoagulation.    3. Chronic kidney disease.  - Kidney function has shown improvement, with creatinine levels decreasing from 1.9 to 1.8.  - Significant drop in kidney function from the 50s to the 30s percentage-wise since last year.  - Renal ultrasound will be ordered to further evaluate the condition.  - Referral to nephrology will be made.    4. Health maintenance.  - Positive screening from [facility].  - Colonoscopy postponed until next month due to late notice for prep.  - Advised to follow a liquid diet 2 to 3 days before the procedure to ease the preparation process.              FOLLOW UP  Return in about 3 months (around 8/28/2025).  Patient was given instructions and counseling regarding his condition or for health maintenance advice. Please see specific information pulled into the AVS if appropriate.       Patient or patient representative verbalized consent for the use of Ambient Listening during the visit with  Matt Camargo MD for chart documentation. 6/16/2025  21:44 EDT    Please note that portions of this note were completed with a voice recognition program.      Matt Cmaargo MD  06/16/25  21:45 EDT

## 2025-06-17 NOTE — ASSESSMENT & PLAN NOTE
Orders:    tamsulosin (FLOMAX) 0.4 MG capsule 24 hr capsule; Take 2 capsules by mouth every night at bedtime.

## 2025-06-19 ENCOUNTER — HOSPITAL ENCOUNTER (OUTPATIENT)
Dept: ULTRASOUND IMAGING | Facility: HOSPITAL | Age: 75
Discharge: HOME | End: 2025-06-19
Admitting: INTERNAL MEDICINE
Payer: MEDICARE

## 2025-06-19 DIAGNOSIS — N18.32 STAGE 3B CHRONIC KIDNEY DISEASE: ICD-10-CM

## 2025-06-19 PROCEDURE — 76775 US EXAM ABDO BACK WALL LIM: CPT

## 2025-07-15 RX ORDER — AMLODIPINE BESYLATE 10 MG/1
10 TABLET ORAL DAILY
Qty: 90 TABLET | Refills: 0 | Status: SHIPPED | OUTPATIENT
Start: 2025-07-15

## (undated) DEVICE — GW INQWIRE FC PTFE STD J/1.5 .035 260

## (undated) DEVICE — GLIDESHEATH SLENDER STAINLESS STEEL KIT: Brand: GLIDESHEATH SLENDER

## (undated) DEVICE — CATH 4F INF PIG 145Â° 110 CM: Brand: INFINITI

## (undated) DEVICE — RADIFOCUS OPTITORQUE ANGIOGRAPHIC CATHETER: Brand: OPTITORQUE

## (undated) DEVICE — TR BAND RADIAL ARTERY COMPRESSION DEVICE: Brand: TR BAND

## (undated) DEVICE — CATH LAB PACK: Brand: MEDLINE INDUSTRIES, INC.